# Patient Record
Sex: FEMALE | Race: WHITE | Employment: OTHER | URBAN - METROPOLITAN AREA
[De-identification: names, ages, dates, MRNs, and addresses within clinical notes are randomized per-mention and may not be internally consistent; named-entity substitution may affect disease eponyms.]

---

## 2017-01-31 RX ORDER — ATORVASTATIN CALCIUM 40 MG/1
TABLET, FILM COATED ORAL
Qty: 90 TABLET | Refills: 0 | Status: SHIPPED | OUTPATIENT
Start: 2017-01-31 | End: 2017-05-17

## 2017-01-31 NOTE — TELEPHONE ENCOUNTER
Requesting Atorvastatin and Dulera  LOV: 4/19/16  RTC: one year  Last Labs: 8/14/16 cmp, last lipid 4/19/16 and ACT 4/19/16 = 24  Filled: 7/19/16 #90 with 1 refills  Atorvastatin  Filled: 7/19/16 #3 inhalers with 1 refill    Dulera    No future appointment

## 2017-03-27 NOTE — TELEPHONE ENCOUNTER
Patient is scheduled for 04/26/2017 and needs her prescription to be filled until she comes in for her sherlyn. Please advise.

## 2017-04-26 ENCOUNTER — OFFICE VISIT (OUTPATIENT)
Dept: FAMILY MEDICINE CLINIC | Facility: CLINIC | Age: 72
End: 2017-04-26

## 2017-04-26 VITALS
RESPIRATION RATE: 16 BRPM | HEART RATE: 72 BPM | TEMPERATURE: 98 F | SYSTOLIC BLOOD PRESSURE: 120 MMHG | WEIGHT: 100.63 LBS | HEIGHT: 61 IN | DIASTOLIC BLOOD PRESSURE: 60 MMHG | BODY MASS INDEX: 19 KG/M2

## 2017-04-26 DIAGNOSIS — R73.09 OTHER ABNORMAL GLUCOSE: ICD-10-CM

## 2017-04-26 DIAGNOSIS — E78.01 FAMILIAL HYPERCHOLESTEROLEMIA: ICD-10-CM

## 2017-04-26 DIAGNOSIS — Z00.00 LABORATORY EXAM ORDERED AS PART OF ROUTINE GENERAL MEDICAL EXAMINATION: ICD-10-CM

## 2017-04-26 DIAGNOSIS — J45.30 MILD PERSISTENT ASTHMA WITHOUT COMPLICATION: ICD-10-CM

## 2017-04-26 DIAGNOSIS — Z12.39 SCREENING FOR BREAST CANCER: ICD-10-CM

## 2017-04-26 DIAGNOSIS — Z00.00 ANNUAL PHYSICAL EXAM: Primary | ICD-10-CM

## 2017-04-26 PROCEDURE — 99397 PER PM REEVAL EST PAT 65+ YR: CPT | Performed by: INTERNAL MEDICINE

## 2017-04-26 PROCEDURE — 99213 OFFICE O/P EST LOW 20 MIN: CPT | Performed by: INTERNAL MEDICINE

## 2017-04-26 NOTE — PROGRESS NOTES
Carlos Barr is a 66year old female who presents for a Annual physical exam.    Was having some constipation / takes miralax as needed     HLD   Denies any muscle aches or pains  Taking medication as directed  Taking baby asa     Asthma  Takes [address] CHOLEST 182 05/16/2015   CHOLEST 167 11/08/2014       Lab Results  Component Value Date   HDL 67 04/19/2016   HDL 87 05/16/2015   HDL 78 11/08/2014       Lab Results  Component Value Date   TRIG 157* 04/19/2016   TRIG 59 05/16/2015   TRIG 89 11/08/2014 able to afford your medications?: Yes    Hearing Problems?: No     Functional Status     Hearing Problems?: No    Vision Problems? : No (wears glasses)    Difficulty walking?: No    Difficulty dressing or bathing?: No    Problems with daily activities? : N 11/09/2013 95   ----------    Cardiovascular Disease Screening     LDL Annually LDL-CHOLESTEROL (mg/dL (calc))   Date Value   04/19/2016 83     LDL CHOLESTROL (mg/dL)   Date Value   09/05/2013 71        EKG - w/ Initial Preventative Physical Exam only, o Immunization Activity if applicable         SPECIFIC DISEASE MONITORING Internal Lab or Procedure External Lab or Procedure   Annual Monitoring of Persistent     Medications (ACE/ARB, digoxin, diuretics)    Potassium  Annually POTASSIUM (mmol/L)   Date Kathy INFORMATION:   Past Medical History   Diagnosis Date   • Diabetes mellitus ([facility] [address]    • Impaired fasting glucose    • Other psoriasis    • Asthma    • Hyperlipidemia           Past Surgical History    COLONOSCOPY  1/2003    OTHER SURGICAL HISTORY      Commen no chest tenderness  BREAST: no masses, no discharge or no axillary lymphadenopathy   LUNGS: clear to auscultation  CARDIO: RRR without murmur  GI: good BS's, no masses, HSM or tenderness  : deferred  RECTAL: deferred  MUSCULOSKELETAL: back is not tender 12/07/2013   • Zoster (Shingles) 12/07/2010

## 2017-05-17 NOTE — TELEPHONE ENCOUNTER
Requesting: Atorvastatin   LOV: 4/26/17  RTC: 1 year  Last Labs: 4/19/16  Filled: 1/31/17 #90 with 0 refills    No future appointments. Refill protocol failed because the patient did not meet the protocol criteria. Last labs were done 4/19/16.  Labs

## 2017-05-18 RX ORDER — ATORVASTATIN CALCIUM 40 MG/1
TABLET, FILM COATED ORAL
Qty: 90 TABLET | Refills: 0 | Status: SHIPPED | OUTPATIENT
Start: 2017-05-18 | End: 2017-10-18

## 2017-06-01 ENCOUNTER — TELEPHONE (OUTPATIENT)
Dept: FAMILY MEDICINE CLINIC | Facility: CLINIC | Age: 72
End: 2017-06-01

## 2017-06-01 NOTE — TELEPHONE ENCOUNTER
Patient was calling to get her lab results. I informed we sent a [employer] message on 5/30/17. Patient does not check her computer. Results given and patient verbalized understanding.

## 2017-10-07 ENCOUNTER — HOSPITAL ENCOUNTER (OUTPATIENT)
Dept: MAMMOGRAPHY | Age: 72
Discharge: HOME | End: 2017-10-07
Attending: INTERNAL MEDICINE
Payer: COMMERCIAL

## 2017-10-07 DIAGNOSIS — Z12.39 SCREENING FOR BREAST CANCER: ICD-10-CM

## 2017-10-07 PROCEDURE — 77067 SCR MAMMO BI INCL CAD: CPT | Performed by: INTERNAL MEDICINE

## 2017-10-10 DIAGNOSIS — Z12.31 ENCOUNTER FOR SCREENING MAMMOGRAM FOR MALIGNANT NEOPLASM OF BREAST: Primary | ICD-10-CM

## 2017-10-19 RX ORDER — ATORVASTATIN CALCIUM 40 MG/1
TABLET, FILM COATED ORAL
Qty: 90 TABLET | Refills: 1 | Status: SHIPPED | OUTPATIENT
Start: 2017-10-19 | End: 2018-04-21

## 2017-10-19 NOTE — TELEPHONE ENCOUNTER
Requesting Atorvastatin 40mg daily  LOV: 4/26/17  RTC: 1 year  Last Labs: 5/26/17  Filled: 5/18/17 #90 with 0 refills    No future appointments. Passed protocol - med approved.

## 2018-02-03 ENCOUNTER — APPOINTMENT (OUTPATIENT)
Dept: GENERAL RADIOLOGY | Age: 73
End: 2018-02-03
Payer: COMMERCIAL

## 2018-02-03 ENCOUNTER — HOSPITAL ENCOUNTER (EMERGENCY)
Age: 73
Discharge: HOME | End: 2018-02-03
Payer: COMMERCIAL

## 2018-02-03 VITALS
HEIGHT: 61 IN | SYSTOLIC BLOOD PRESSURE: 153 MMHG | HEART RATE: 92 BPM | OXYGEN SATURATION: 100 % | WEIGHT: 98 LBS | TEMPERATURE: 99 F | RESPIRATION RATE: 18 BRPM | DIASTOLIC BLOOD PRESSURE: 76 MMHG | BODY MASS INDEX: 18.5 KG/M2

## 2018-02-03 DIAGNOSIS — S20.212A RIB CONTUSION, LEFT, INITIAL ENCOUNTER: ICD-10-CM

## 2018-02-03 DIAGNOSIS — R93.89 ABNORMAL FINDING ON RADIOLOGY EXAM: Primary | ICD-10-CM

## 2018-02-03 PROCEDURE — 99283 EMERGENCY DEPT VISIT LOW MDM: CPT

## 2018-02-03 PROCEDURE — 71101 X-RAY EXAM UNILAT RIBS/CHEST: CPT

## 2018-02-03 RX ORDER — HYDROCODONE BITARTRATE AND ACETAMINOPHEN 5; 325 MG/1; MG/1
1 TABLET ORAL EVERY 6 HOURS PRN
Qty: 10 TABLET | Refills: 0 | Status: SHIPPED | OUTPATIENT
Start: 2018-02-03 | End: 2019-05-04 | Stop reason: ALTCHOICE

## 2018-02-03 NOTE — ED PROVIDER NOTES
Patient Seen in: [facility] Emergency Department In [place]    History   Patient presents with:  Trauma (cardiovascular, musculoskeletal)    Stated Complaint: rib pain    69-year-old female presents today with history of fall and left rib injury.   States oriented to person, place, and time. She appears well-developed and well-nourished. No distress. HENT:   Head: Normocephalic. Neck: Normal range of motion. Neck supple. Cardiovascular: Normal rate and regular rhythm.     Pulmonary/Chest: Effort normal Discussed results with patient. I will give patient prescription for Norco for severe pain. Instructed to take ibuprofen and still having pain then she can take Norco.  To follow with primary MD in 5-7 days for reevaluation and possible outpatient CT.

## 2018-02-03 NOTE — ED INITIAL ASSESSMENT (HPI)
Pt states that she fell backwards on Wed hitting lt side of back on cabinets. Denies feeling dizzy when she fell. States started hurting to lt side of back (ribs) yesterday.

## 2018-02-03 NOTE — ED NOTES
Pt states to RN upon discharge, \"Esther, I am very upset. That doctor or that nurse practitioner told my results of my xray in front of my son. He should have asked if he could tell the results in front of him.   I didn't want my son to know I have a sha

## 2018-02-07 ENCOUNTER — OFFICE VISIT (OUTPATIENT)
Dept: FAMILY MEDICINE CLINIC | Facility: CLINIC | Age: 73
End: 2018-02-07

## 2018-02-07 VITALS
SYSTOLIC BLOOD PRESSURE: 122 MMHG | DIASTOLIC BLOOD PRESSURE: 62 MMHG | HEIGHT: 61 IN | TEMPERATURE: 98 F | BODY MASS INDEX: 19.33 KG/M2 | RESPIRATION RATE: 16 BRPM | WEIGHT: 102.38 LBS | HEART RATE: 72 BPM

## 2018-02-07 DIAGNOSIS — R07.81 RIB PAIN ON LEFT SIDE: ICD-10-CM

## 2018-02-07 DIAGNOSIS — R91.1 LUNG NODULE SEEN ON IMAGING STUDY: Primary | ICD-10-CM

## 2018-02-07 PROCEDURE — 99213 OFFICE O/P EST LOW 20 MIN: CPT | Performed by: FAMILY MEDICINE

## 2018-02-07 NOTE — PROGRESS NOTES
HPI:   Shekhar Nix is a 74year old female that presents for follow-up rib pain. She fell down last week and hit her left side on the countertop.   She had no significant pain initially but over the next few days had worsening left lateral rib pain but Bottle, Rfl: 0    REVIEW OF SYSTEMS:     Comprehensive ROS negative unless noted in HPI    PHYSICAL EXAM:   /62   Pulse 72   Temp 97.9 °F (36.6 °C) (Oral)   Resp 16   Ht 61\"   Wt 102 lb 6 oz   BMI 19.34 kg/m²  Estimated body mass index is 19.34 kg/m

## 2018-02-07 NOTE — PATIENT INSTRUCTIONS
We will contact your insurance for CT scan approval, and call you to schedule. Feel free to contact us if you have not heard anything in 1 week. Rib Contusion     A rib contusion is a bruise to one or more rib bones.  It may cause pain, tenderness, s © 7191-7843 [address]. [address] [facility], [facility] [place]. All rights reserved. This information is not intended as a substitute for professional medical care. Always follow your healthcare professional's instructions.

## 2018-02-15 ENCOUNTER — HOSPITAL ENCOUNTER (OUTPATIENT)
Dept: CT IMAGING | Age: 73
Discharge: HOME | End: 2018-02-15
Attending: FAMILY MEDICINE
Payer: COMMERCIAL

## 2018-02-15 DIAGNOSIS — R91.1 LUNG NODULE SEEN ON IMAGING STUDY: ICD-10-CM

## 2018-02-15 PROCEDURE — 71250 CT THORAX DX C-: CPT | Performed by: FAMILY MEDICINE

## 2018-02-16 ENCOUNTER — TELEPHONE (OUTPATIENT)
Dept: FAMILY MEDICINE CLINIC | Facility: CLINIC | Age: 73
End: 2018-02-16

## 2018-02-16 NOTE — TELEPHONE ENCOUNTER
Patient informed of her result and was very relieved. I told her to call with any concerns.     Time: 2 min

## 2018-02-16 NOTE — TELEPHONE ENCOUNTER
CT chest resulted this Am, and released to my chart. It is negative. She can follow up with any questions or concerns, but CT is normal and reassuring.       Susan Elias, DO  Family Medicine

## 2018-04-12 ENCOUNTER — TELEPHONE (OUTPATIENT)
Dept: FAMILY MEDICINE CLINIC | Facility: CLINIC | Age: 73
End: 2018-04-12

## 2018-04-12 NOTE — TELEPHONE ENCOUNTER
Pt needs refills on  ,sent to mail order    DULERA 100-5 MCG/ACT Inhalation Aerosol 3 Inhaler 0 1/31/2017    Sig :  Use 2 puffs two times daily     Route:   (none)     Order #:   [id number]

## 2018-04-12 NOTE — TELEPHONE ENCOUNTER
Patient needs Laura He cancelled from [employer] and resubmitted to the correct insurance, [employer] and Mail order which is the per this request.

## 2018-04-12 NOTE — TELEPHONE ENCOUNTER
Requesting Dulera  LOV: 2/7/18  RTC: 2  Months   Last Relevant Labs: pp  Filled: 1/31/17 #3 inh with 0 refills    Future Appointments  Date Time Provider Sol Luna   4/21/2018 8:00 AM Emerson Wade,  [facility] [address] [facility]       Refilled per prot

## 2018-04-21 ENCOUNTER — OFFICE VISIT (OUTPATIENT)
Dept: FAMILY MEDICINE CLINIC | Facility: CLINIC | Age: 73
End: 2018-04-21

## 2018-04-21 ENCOUNTER — LAB ENCOUNTER (OUTPATIENT)
Dept: LAB | Age: 73
End: 2018-04-21
Attending: FAMILY MEDICINE
Payer: COMMERCIAL

## 2018-04-21 VITALS
HEIGHT: 60.5 IN | RESPIRATION RATE: 16 BRPM | DIASTOLIC BLOOD PRESSURE: 62 MMHG | HEART RATE: 62 BPM | WEIGHT: 99.13 LBS | TEMPERATURE: 98 F | SYSTOLIC BLOOD PRESSURE: 128 MMHG | BODY MASS INDEX: 18.96 KG/M2

## 2018-04-21 DIAGNOSIS — E78.2 MIXED HYPERLIPIDEMIA: ICD-10-CM

## 2018-04-21 DIAGNOSIS — R73.03 PRE-DIABETES: ICD-10-CM

## 2018-04-21 DIAGNOSIS — M81.0 AGE-RELATED OSTEOPOROSIS WITHOUT CURRENT PATHOLOGICAL FRACTURE: ICD-10-CM

## 2018-04-21 DIAGNOSIS — J45.40 MODERATE PERSISTENT ASTHMA WITHOUT COMPLICATION: ICD-10-CM

## 2018-04-21 DIAGNOSIS — Z51.81 THERAPEUTIC DRUG MONITORING: ICD-10-CM

## 2018-04-21 DIAGNOSIS — Z00.00 ENCOUNTER FOR ANNUAL HEALTH EXAMINATION: ICD-10-CM

## 2018-04-21 DIAGNOSIS — B02.9 HERPES ZOSTER WITHOUT COMPLICATION: ICD-10-CM

## 2018-04-21 DIAGNOSIS — Z00.00 ENCOUNTER FOR ANNUAL HEALTH EXAMINATION: Primary | ICD-10-CM

## 2018-04-21 PROCEDURE — 80061 LIPID PANEL: CPT

## 2018-04-21 PROCEDURE — 80053 COMPREHEN METABOLIC PANEL: CPT

## 2018-04-21 PROCEDURE — 99397 PER PM REEVAL EST PAT 65+ YR: CPT | Performed by: FAMILY MEDICINE

## 2018-04-21 PROCEDURE — 83036 HEMOGLOBIN GLYCOSYLATED A1C: CPT

## 2018-04-21 PROCEDURE — 85025 COMPLETE CBC W/AUTO DIFF WBC: CPT

## 2018-04-21 PROCEDURE — 36415 COLL VENOUS BLD VENIPUNCTURE: CPT

## 2018-04-21 PROCEDURE — 84443 ASSAY THYROID STIM HORMONE: CPT

## 2018-04-21 RX ORDER — ATORVASTATIN CALCIUM 40 MG/1
40 TABLET, FILM COATED ORAL
Qty: 90 TABLET | Refills: 1 | Status: SHIPPED | OUTPATIENT
Start: 2018-04-21 | End: 2018-04-23

## 2018-04-21 NOTE — PROGRESS NOTES
HPI:   Jacob Worthy is a 74year old female who presents for a annual wellness exam.    Annual Physical due on 04/26/2018        Fall/Risk Assessment   She has been screened for Falls and is low risk: Fall/Risk Scoring: 3    Cognitive Assessment   She restart.   Walk 2 miles 5 times per week and taked Calcium with D     Pre-diabetes: diet controlled, last a1c 6.0      HLD (hyperlipidemia): stable on statin without side effects     Herpes zoster without mention of complication     Hearing loss: stable, no tablet by mouth daily. MEDICAL INFORMATION:   She  has a past medical history of Aortic valve sclerosis (6/29/2012); Asthma; GERD (gastroesophageal reflux disease) (3/12/2013); Hearing loss (3/12/2013);  Herpes zoster without mention of complication (6 this encounter: 61\". Weight as of this encounter: 99 lb 2 oz.     Medicare Hearing Assessment  (Required for AWV/SWV)    Questionnaire - +hx of hearing difficulty, no hearing aids at this time         General Appearance:  Alert, cooperative, no distress Future  -     LIPID PANEL;  Future  -     TSH W REFLEX TO FREE T4; Future  -     HEMOGLOBIN A1C; Future    Pre-diabetes  - A1c 6.0 in 2016, weight it good, continue healthy diet and exercise  -     HEMOGLOBIN A1C; Future    Age-related osteoporosis without flowsheet data found.     Fasting Blood Sugar (FSB)Annually   GLUCOSE (mg/dL)   Date Value   05/26/2017 94   ----------       Cardiovascular Disease Screening     LDL Annually LDL-CHOLESTEROL (mg/dL (calc))   Date Value   05/26/2017 82        EKG - w/ Initi the mentally retarded   Persons who live in the same house as a HepB virus carrier   Homosexual men   Illicit injectable drug abusers     Tetanus Toxoid  Only covered with a cut with metal- TD and TDaP Not covered by Medicare Part B No vaccine history foun

## 2018-04-21 NOTE — PATIENT INSTRUCTIONS
Aida Esquivel's SCREENING SCHEDULE   Tests on this list are recommended by your physician but may not be covered, or covered at this frequency, by your insurer. Please check with your insurance carrier before scheduling to verify coverage.    PREVENTATIV Colonoscopy Screen   Covered every 10 years- more often if abnormal Colonoscopy,3 Years due on 03/22/2021 Update Health Maintenance if applicable    Flex Sigmoidoscopy Screen  Covered every 5 years No results found for this or any previous visit.  No flowsh (Pneumovax)  Covered Once after 65   Orders placed or performed in visit on 09/05/13  -PNEUMOCOCCAL IMMUNIZATION    Please get once after your 65th birthday    Hepatitis B for Moderate/High Risk       No orders found for this or any previous visit.  Medium/

## 2018-04-23 ENCOUNTER — TELEPHONE (OUTPATIENT)
Dept: FAMILY MEDICINE CLINIC | Facility: CLINIC | Age: 73
End: 2018-04-23

## 2018-04-23 DIAGNOSIS — E78.2 MIXED HYPERLIPIDEMIA: ICD-10-CM

## 2018-04-23 RX ORDER — ATORVASTATIN CALCIUM 40 MG/1
40 TABLET, FILM COATED ORAL
Qty: 90 TABLET | Refills: 1 | Status: SHIPPED | OUTPATIENT
Start: 2018-04-23 | End: 2018-12-07

## 2018-04-23 NOTE — TELEPHONE ENCOUNTER
Medication re-sent to [facility] and cancelled at local pharmacy  LM informing pt

## 2018-04-23 NOTE — TELEPHONE ENCOUNTER
Patient states that her Atorvastatin was sent to local pharmacy instead of her mail order pharmacy. Please send for 90 days.

## 2018-05-03 ENCOUNTER — TELEPHONE (OUTPATIENT)
Dept: FAMILY MEDICINE CLINIC | Facility: CLINIC | Age: 73
End: 2018-05-03

## 2018-05-03 NOTE — TELEPHONE ENCOUNTER
Pt is asking about her lab results from 2 weeks ago. Please call her back at work - [phone]. Thanks!

## 2018-05-03 NOTE — TELEPHONE ENCOUNTER
Spoke to pt and informed of lab results. Notes recorded by Michelle Fischer DO on 4/23/2018 at 12:48 PM CDT  Results reviewed. Released to [address] Tests show no significant abnormalities.

## 2018-10-06 ENCOUNTER — HOSPITAL ENCOUNTER (OUTPATIENT)
Dept: MAMMOGRAPHY | Age: 73
Discharge: HOME | End: 2018-10-06
Attending: INTERNAL MEDICINE
Payer: COMMERCIAL

## 2018-10-06 DIAGNOSIS — Z12.31 ENCOUNTER FOR SCREENING MAMMOGRAM FOR MALIGNANT NEOPLASM OF BREAST: ICD-10-CM

## 2018-10-06 PROCEDURE — 77067 SCR MAMMO BI INCL CAD: CPT | Performed by: INTERNAL MEDICINE

## 2018-12-01 ENCOUNTER — HOSPITAL ENCOUNTER (OUTPATIENT)
Age: 73
Discharge: HOME | End: 2018-12-01
Attending: FAMILY MEDICINE
Payer: COMMERCIAL

## 2018-12-01 VITALS
RESPIRATION RATE: 16 BRPM | HEART RATE: 79 BPM | TEMPERATURE: 98 F | SYSTOLIC BLOOD PRESSURE: 111 MMHG | DIASTOLIC BLOOD PRESSURE: 75 MMHG | OXYGEN SATURATION: 100 %

## 2018-12-01 DIAGNOSIS — I88.9 INGUINAL LYMPHADENITIS: Primary | ICD-10-CM

## 2018-12-01 PROCEDURE — 81002 URINALYSIS NONAUTO W/O SCOPE: CPT | Performed by: FAMILY MEDICINE

## 2018-12-01 PROCEDURE — 87086 URINE CULTURE/COLONY COUNT: CPT | Performed by: FAMILY MEDICINE

## 2018-12-01 PROCEDURE — 99214 OFFICE O/P EST MOD 30 MIN: CPT

## 2018-12-01 PROCEDURE — 99202 OFFICE O/P NEW SF 15 MIN: CPT

## 2018-12-01 RX ORDER — CEPHALEXIN 500 MG/1
500 CAPSULE ORAL 3 TIMES DAILY
Qty: 21 CAPSULE | Refills: 0 | Status: SHIPPED | OUTPATIENT
Start: 2018-12-01 | End: 2018-12-08

## 2018-12-01 NOTE — ED NOTES
Discussed medication with patient and symptoms treatment thoroughly. All questions answered. Patient verbalized understanding of plan of care.

## 2018-12-01 NOTE — ED PROVIDER NOTES
Patient Seen in: [facility] In [place]    History   Patient presents with:  Groin Pain    Stated Complaint: Lump or swollen gland in groin area    HPI  75 yo F here with complaints of lump or swollen gland noted in the L groin area - no other o All other systems reviewed and negative except as noted above.     Physical Exam     ED Triage Vitals [12/01/18 0934]   /75   Pulse 79   Resp 16   Temp 98.3 °F (36.8 °C)   Temp src Temporal   SpO2 100 %   O2 Device        Current:/75   Pulse 10:13 am    Follow-up:  Earle Lopez DO  [address] [facility]  [phone]    In 2 days  For re-check of symptoms        Medications Prescribed:  Discharge Medication List as of 12/1/2018 10:18 AM    START taking these medi

## 2018-12-01 NOTE — ED INITIAL ASSESSMENT (HPI)
Patient c/o swelling and tenderness to left groin. Was feeling some discomfort throughout the week. Noticed the swelling last night. Today area is tender.

## 2018-12-07 DIAGNOSIS — E78.2 MIXED HYPERLIPIDEMIA: ICD-10-CM

## 2018-12-07 RX ORDER — ATORVASTATIN CALCIUM 40 MG/1
TABLET, FILM COATED ORAL
Qty: 90 TABLET | Refills: 0 | Status: SHIPPED | OUTPATIENT
Start: 2018-12-07 | End: 2019-03-21

## 2018-12-07 NOTE — TELEPHONE ENCOUNTER
Requested Medications      Name from pharmacy: Atorvastatin Calcium 40 MG Tablet         Will file in chart as: ATORVASTATIN 40 MG Oral Tab    Sig: TAKE 1 TABLET BY MOUTH DAILY    Disp:  90 tablet    Refills:  3    Start: 12/7/2018    Class: Normal    For:

## 2018-12-23 ENCOUNTER — NURSE ONLY (OUTPATIENT)
Dept: FAMILY MEDICINE CLINIC | Facility: CLINIC | Age: 73
End: 2018-12-23
Payer: COMMERCIAL

## 2018-12-23 VITALS
SYSTOLIC BLOOD PRESSURE: 108 MMHG | DIASTOLIC BLOOD PRESSURE: 64 MMHG | BODY MASS INDEX: 18.88 KG/M2 | RESPIRATION RATE: 16 BRPM | HEIGHT: 61 IN | HEART RATE: 87 BPM | WEIGHT: 100 LBS | TEMPERATURE: 98 F | OXYGEN SATURATION: 99 %

## 2018-12-23 DIAGNOSIS — H92.01 RIGHT EAR PAIN: Primary | ICD-10-CM

## 2018-12-23 PROCEDURE — 99213 OFFICE O/P EST LOW 20 MIN: CPT | Performed by: NURSE PRACTITIONER

## 2018-12-23 NOTE — PROGRESS NOTES
CHIEF COMPLAINT:   Patient presents with:  Ear Pain: x 2 days      HPI:   Josiah Michaels is a 74year old female who presents to clinic today with complaints of right ear pain. Has had for 2  days. Pain is described as stabbing.   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 Does not want to restart.      • Other and unspecified ovarian cyst 6/29/2012   • Other psoriasis    • Pre-diabetes    • Wears glasses PLAN: Meds as listed below.   Comfort measures as described in Patient Instructions  Educated on otc medication  Educated supportive measures  Educated on s/s of worsening  Follow up with pcp if not improving    Meds & Refills for this Visit:  Requested Pre Because the middle ear fluid can become infected, it is important to watch for signs of an ear infection which may develop later. These signs include increased ear pain, fever, or drainage from the ear.   Home care  The following guidelines will help you ca

## 2019-03-21 ENCOUNTER — TELEPHONE (OUTPATIENT)
Dept: FAMILY MEDICINE CLINIC | Facility: CLINIC | Age: 74
End: 2019-03-21

## 2019-03-21 DIAGNOSIS — E78.2 MIXED HYPERLIPIDEMIA: ICD-10-CM

## 2019-03-21 RX ORDER — ATORVASTATIN CALCIUM 40 MG/1
40 TABLET, FILM COATED ORAL
Qty: 90 TABLET | Refills: 0 | Status: SHIPPED | OUTPATIENT
Start: 2019-03-21 | End: 2019-03-22

## 2019-03-21 NOTE — TELEPHONE ENCOUNTER
Requesting: Atorvastatin  LOV: 4/21/18  RTC: 6 months  Last Relevant Labs: 4/21/18  Filled: 12/7/18 #90 with 0 refills    Future Appointments   Date Time Provider Sol Luna   5/4/2019  8:30 AM Racquel Wade, DO [facility] [address]     Refilled u

## 2019-03-21 NOTE — TELEPHONE ENCOUNTER
Patient is requesting a refill of ATORVASTATIN 40 MG Oral Tab  I scheduled her physical    Future Appointments   Date Time Provider Sol Luna   5/4/2019  8:30 AM Ivanna Wade,  [facility] [address]

## 2019-03-22 ENCOUNTER — TELEPHONE (OUTPATIENT)
Dept: FAMILY MEDICINE CLINIC | Facility: CLINIC | Age: 74
End: 2019-03-22

## 2019-03-22 DIAGNOSIS — E78.2 MIXED HYPERLIPIDEMIA: ICD-10-CM

## 2019-03-22 RX ORDER — ATORVASTATIN CALCIUM 40 MG/1
40 TABLET, FILM COATED ORAL
Qty: 90 TABLET | Refills: 0 | Status: SHIPPED | OUTPATIENT
Start: 2019-03-22 | End: 2019-05-04

## 2019-03-22 NOTE — TELEPHONE ENCOUNTER
Pt would like her atorvastatin 40 MG Oral Tab to be sent to: ([address] [place] [address], [address] [phone], [phone]) instead. Pt would also like a call to confirm it has been taken care of.

## 2019-04-24 ENCOUNTER — TELEPHONE (OUTPATIENT)
Dept: FAMILY MEDICINE CLINIC | Facility: CLINIC | Age: 74
End: 2019-04-24

## 2019-04-24 DIAGNOSIS — Z00.00 LABORATORY EXAMINATION ORDERED AS PART OF A ROUTINE GENERAL MEDICAL EXAMINATION: ICD-10-CM

## 2019-04-24 DIAGNOSIS — Z51.81 ENCOUNTER FOR THERAPEUTIC DRUG MONITORING: ICD-10-CM

## 2019-04-24 DIAGNOSIS — E78.49 OTHER HYPERLIPIDEMIA: ICD-10-CM

## 2019-04-24 DIAGNOSIS — R73.03 PRE-DIABETES: Primary | ICD-10-CM

## 2019-04-24 NOTE — TELEPHONE ENCOUNTER
lmom for patient - wanted to confirm reason for visit due to \"routine\"  Used in description, but PHysical used in visit type   Does this patient want a Med refill visit, or a physical.

## 2019-04-24 NOTE — TELEPHONE ENCOUNTER
Future Appointments   Date Time Provider Sol Luna   5/4/2019  8:30 AM Dontrell Wade, DO [facility] [address]     Patient has been notified via [employer] reminder her to have her labs done 1-2 days prior to appt.     Labs pended for review/approval.

## 2019-05-04 ENCOUNTER — OFFICE VISIT (OUTPATIENT)
Dept: FAMILY MEDICINE CLINIC | Facility: CLINIC | Age: 74
End: 2019-05-04
Payer: COMMERCIAL

## 2019-05-04 ENCOUNTER — LAB ENCOUNTER (OUTPATIENT)
Dept: LAB | Age: 74
End: 2019-05-04
Attending: FAMILY MEDICINE
Payer: COMMERCIAL

## 2019-05-04 VITALS
SYSTOLIC BLOOD PRESSURE: 120 MMHG | HEIGHT: 61 IN | BODY MASS INDEX: 18.53 KG/M2 | WEIGHT: 98.13 LBS | RESPIRATION RATE: 16 BRPM | TEMPERATURE: 98 F | HEART RATE: 68 BPM | DIASTOLIC BLOOD PRESSURE: 62 MMHG

## 2019-05-04 DIAGNOSIS — R73.03 PRE-DIABETES: ICD-10-CM

## 2019-05-04 DIAGNOSIS — Z00.00 ANNUAL PHYSICAL EXAM: Primary | ICD-10-CM

## 2019-05-04 DIAGNOSIS — Z51.81 ENCOUNTER FOR THERAPEUTIC DRUG MONITORING: ICD-10-CM

## 2019-05-04 DIAGNOSIS — Z12.39 BREAST CANCER SCREENING: ICD-10-CM

## 2019-05-04 DIAGNOSIS — Z13.31 NEGATIVE DEPRESSION SCREENING: ICD-10-CM

## 2019-05-04 DIAGNOSIS — E78.49 OTHER HYPERLIPIDEMIA: ICD-10-CM

## 2019-05-04 DIAGNOSIS — J45.41 MODERATE PERSISTENT ASTHMA WITH ACUTE EXACERBATION: ICD-10-CM

## 2019-05-04 DIAGNOSIS — E78.2 MIXED HYPERLIPIDEMIA: ICD-10-CM

## 2019-05-04 DIAGNOSIS — Z00.00 LABORATORY EXAMINATION ORDERED AS PART OF A ROUTINE GENERAL MEDICAL EXAMINATION: ICD-10-CM

## 2019-05-04 PROCEDURE — 85025 COMPLETE CBC W/AUTO DIFF WBC: CPT

## 2019-05-04 PROCEDURE — 80061 LIPID PANEL: CPT

## 2019-05-04 PROCEDURE — 36415 COLL VENOUS BLD VENIPUNCTURE: CPT

## 2019-05-04 PROCEDURE — 80053 COMPREHEN METABOLIC PANEL: CPT

## 2019-05-04 PROCEDURE — 84443 ASSAY THYROID STIM HORMONE: CPT

## 2019-05-04 PROCEDURE — 99397 PER PM REEVAL EST PAT 65+ YR: CPT | Performed by: FAMILY MEDICINE

## 2019-05-04 PROCEDURE — 83036 HEMOGLOBIN GLYCOSYLATED A1C: CPT

## 2019-05-04 RX ORDER — ALBUTEROL SULFATE 90 UG/1
AEROSOL, METERED RESPIRATORY (INHALATION)
Qty: 36 G | Refills: 5 | Status: SHIPPED | OUTPATIENT
Start: 2019-05-04 | End: 2020-03-03

## 2019-05-04 RX ORDER — ATORVASTATIN CALCIUM 40 MG/1
40 TABLET, FILM COATED ORAL
Qty: 90 TABLET | Refills: 3 | Status: SHIPPED | OUTPATIENT
Start: 2019-05-04 | End: 2020-03-03

## 2019-05-04 NOTE — PROGRESS NOTES
Jayy Graham is a 74year old female that presents for annual physical exam.     STI testing desired: no  Mammogram: 10/16/18  Colonoscopy: 3/22/18  PHQ2: 0  Vaccines: declines pneumonia and shingrix, but will consider getting at pharmacy   Diet and Exer Inhalation Aerosol, INHALE 2 PUFFS ORALLY TWICE DAILY, Disp: 3 Inhaler, Rfl: 3  •  Albuterol Sulfate HFA (VENTOLIN HFA) 108 (90 Base) MCG/ACT Inhalation Aero Soln, Use 2 puffs every 6 hours  as needed, Disp: 36 g, Rfl: 5  •  Calcium Carbonate-Vitamin D (CA Highest education level: Not on file    Occupational History      Occupation:  SUPERVISOR        Employer: [employer]        Comment:     Social Needs      Financial resource strain: Not on file      Food insecurity:        W GENERAL: feels well otherwise  SKIN: denies any unusual skin lesions  EYES: denies blurred vision or double vision  HEENT: denies nasal congestion, sinus pain or ST  LUNGS: denies shortness of breath with exertion  CARDIOVASCULAR: denies chest pain on ex Negative depression screening    3. Breast cancer screening  - [facility] DUTCH 2D+3D SCREENING BILAT (CPT=77067/00618); Future    4. Mixed hyperlipidemia  - atorvastatin 40 MG Oral Tab; Take 1 tablet (40 mg total) by mouth once daily. Dispense: 90 tablet;  Refill

## 2019-05-04 NOTE — PATIENT INSTRUCTIONS
Thank you for allowing me to participate in your care today. I will contact you with any results from today's visit. Lab results are typically available in 2-3 days for blood tests, and 3-5 days for any cultures or Paps.    Please let me know if you hav age 72 Talk with your healthcare provider   Chlamydia Women at increased risk for infection At routine exams   Colorectal cancer All women over the age of 48  This screening is advised against for women over 76 or if there is a life expectancy of less than provider Ask your healthcare provider   Vision All women in this age group Every 1 to 2 years; if you have a chronic health condition, ask your healthcare provider if you need exams more often   Vaccine Who needs it How often   Chickenpox (varicella) All w risk of getting CVD within the next 10 years. People who are not at increased risk for bleeding, have a life expectancy of at least 10 years, and are willing to take low-dose aspirin daily for at least 10 years are more likely to benefit.  When the advanta

## 2019-05-15 ENCOUNTER — TELEPHONE (OUTPATIENT)
Dept: FAMILY MEDICINE CLINIC | Facility: CLINIC | Age: 74
End: 2019-05-15

## 2019-05-15 NOTE — TELEPHONE ENCOUNTER
See results below. Spoke with patient regarding lab results. Patient aware, all questions answered. Patient verbalized understanding       Notes recorded by Koffi DO Esthela on 5/6/2019 at 12:55 PM CDT  Results reviewed. Released to [address]  Tests show

## 2019-08-15 ENCOUNTER — OFFICE VISIT (OUTPATIENT)
Dept: FAMILY MEDICINE CLINIC | Facility: CLINIC | Age: 74
End: 2019-08-15
Payer: COMMERCIAL

## 2019-08-15 ENCOUNTER — HOSPITAL ENCOUNTER (EMERGENCY)
Age: 74
Discharge: HOME | End: 2019-08-15
Attending: EMERGENCY MEDICINE
Payer: COMMERCIAL

## 2019-08-15 ENCOUNTER — APPOINTMENT (OUTPATIENT)
Dept: GENERAL RADIOLOGY | Age: 74
End: 2019-08-15
Payer: COMMERCIAL

## 2019-08-15 VITALS
HEART RATE: 84 BPM | BODY MASS INDEX: 18.31 KG/M2 | HEIGHT: 61 IN | TEMPERATURE: 98 F | SYSTOLIC BLOOD PRESSURE: 131 MMHG | DIASTOLIC BLOOD PRESSURE: 61 MMHG | RESPIRATION RATE: 18 BRPM | WEIGHT: 97 LBS | OXYGEN SATURATION: 99 %

## 2019-08-15 VITALS
WEIGHT: 97.63 LBS | HEART RATE: 75 BPM | TEMPERATURE: 98 F | BODY MASS INDEX: 18.43 KG/M2 | OXYGEN SATURATION: 98 % | HEIGHT: 61 IN | SYSTOLIC BLOOD PRESSURE: 130 MMHG | DIASTOLIC BLOOD PRESSURE: 68 MMHG | RESPIRATION RATE: 16 BRPM

## 2019-08-15 DIAGNOSIS — R50.9 FEVER, UNSPECIFIED FEVER CAUSE: ICD-10-CM

## 2019-08-15 DIAGNOSIS — R05.9 COUGH: ICD-10-CM

## 2019-08-15 DIAGNOSIS — R07.89 ATYPICAL CHEST PAIN: Primary | ICD-10-CM

## 2019-08-15 DIAGNOSIS — R05.9 COUGH: Primary | ICD-10-CM

## 2019-08-15 DIAGNOSIS — Z87.891 FORMER SMOKER: ICD-10-CM

## 2019-08-15 PROCEDURE — 87430 STREP A AG IA: CPT

## 2019-08-15 PROCEDURE — 87081 CULTURE SCREEN ONLY: CPT

## 2019-08-15 PROCEDURE — 87430 STREP A AG IA: CPT | Performed by: EMERGENCY MEDICINE

## 2019-08-15 PROCEDURE — 71046 X-RAY EXAM CHEST 2 VIEWS: CPT

## 2019-08-15 PROCEDURE — 99284 EMERGENCY DEPT VISIT MOD MDM: CPT

## 2019-08-15 PROCEDURE — 87081 CULTURE SCREEN ONLY: CPT | Performed by: EMERGENCY MEDICINE

## 2019-08-15 PROCEDURE — 99283 EMERGENCY DEPT VISIT LOW MDM: CPT

## 2019-08-15 RX ORDER — AZITHROMYCIN 250 MG/1
TABLET, FILM COATED ORAL
Qty: 1 PACKAGE | Refills: 0 | Status: SHIPPED | OUTPATIENT
Start: 2019-08-15 | End: 2019-08-20

## 2019-08-15 RX ORDER — FLUTICASONE PROPIONATE 50 MCG
2 SPRAY, SUSPENSION (ML) NASAL DAILY
Qty: 16 G | Refills: 0 | Status: SHIPPED | OUTPATIENT
Start: 2019-08-15 | End: 2019-09-14

## 2019-08-15 NOTE — ED PROVIDER NOTES
Patient Seen in: THE [facility] Emergency Department In [facility] [place] [address]    History   Patient presents with:  Fever (infectious)  Cough/URI    Stated Complaint: cough fever and shortness of breath, fever last night    HPI    Patient is a very pleasant 77-year-old fema breath, fever last night  Other systems are as noted in HPI. Constitutional and vital signs reviewed. All other systems reviewed and negative except as noted above.     Physical Exam     ED Triage Vitals [08/15/19 1355]   /61   Pulse 84   Resp 1 fever. Pt has a history of asthma and smoking. FINDINGS:  Normal heart size and pulmonary vascularity. No pleural effusion or pneumothorax. No lobar consolidation. Stable COPD changes. Calcified plaque in the thoracic aorta.   Degenerative changes in t

## 2019-08-15 NOTE — ED INITIAL ASSESSMENT (HPI)
REPORTS SORE THROAT FOR PAST WEEK.  2 DAYS WITH COUGH AND FEVER AND RIB PAIN. STATES PAIN AND COUGH GETTING WORSE.   TOOK 400MG IBUPROFEN ABOUT 0900

## 2019-08-15 NOTE — PROGRESS NOTES
Pt presents to [facility] for evaluation of cough, malaise and back pain. Back pain occurs spontaneously. It is not reproducible with movement, coughing or deep inspiration. Pt takes inhaler daily for asthma.  She states with prolonged expiration, she feels pain

## 2019-10-29 ENCOUNTER — HOSPITAL ENCOUNTER (OUTPATIENT)
Dept: MAMMOGRAPHY | Age: 74
Discharge: HOME | End: 2019-10-29
Attending: FAMILY MEDICINE
Payer: COMMERCIAL

## 2019-10-29 DIAGNOSIS — Z12.39 BREAST CANCER SCREENING: ICD-10-CM

## 2019-10-29 PROCEDURE — 77067 SCR MAMMO BI INCL CAD: CPT | Performed by: FAMILY MEDICINE

## 2019-10-29 PROCEDURE — 77063 BREAST TOMOSYNTHESIS BI: CPT | Performed by: FAMILY MEDICINE

## 2020-01-02 ENCOUNTER — APPOINTMENT (OUTPATIENT)
Dept: CARDIOLOGY | Age: 75
End: 2020-01-02

## 2020-02-25 ENCOUNTER — TELEPHONE (OUTPATIENT)
Dept: FAMILY MEDICINE CLINIC | Facility: CLINIC | Age: 75
End: 2020-02-25

## 2020-02-25 DIAGNOSIS — Z00.00 LABORATORY EXAMINATION ORDERED AS PART OF A ROUTINE GENERAL MEDICAL EXAMINATION: Primary | ICD-10-CM

## 2020-02-25 NOTE — TELEPHONE ENCOUNTER
Pt would like to know if she can have lab orders prior to her wellness visit.   Future Appointments   Date Time Provider Sol Luna   3/3/2020  6:30 PM Rahel Wade,  [facility] [address]

## 2020-02-25 NOTE — TELEPHONE ENCOUNTER
Spoke to patient and informed of orders being placed, advised to fast 8-10 hrs prior to completing the labs. Patient verbalized understanding with intent to comply.    Future Appointments   Date Time Provider Sol Luna   3/3/2020  6:30 PM Ying Wade

## 2020-02-29 ENCOUNTER — APPOINTMENT (OUTPATIENT)
Dept: LAB | Age: 75
End: 2020-02-29
Attending: FAMILY MEDICINE
Payer: COMMERCIAL

## 2020-02-29 DIAGNOSIS — Z00.00 LABORATORY EXAMINATION ORDERED AS PART OF A ROUTINE GENERAL MEDICAL EXAMINATION: ICD-10-CM

## 2020-02-29 LAB
ALBUMIN SERPL-MCNC: 4.2 G/DL (ref 3.4–5)
ALBUMIN/GLOB SERPL: 1.3 {RATIO} (ref 1–2)
ALP LIVER SERPL-CCNC: 80 U/L (ref 55–142)
ALT SERPL-CCNC: 19 U/L (ref 13–56)
ANION GAP SERPL CALC-SCNC: 4 MMOL/L (ref 0–18)
AST SERPL-CCNC: 20 U/L (ref 15–37)
BASOPHILS # BLD AUTO: 0.05 X10(3) UL (ref 0–0.2)
BASOPHILS NFR BLD AUTO: 1.2 %
BILIRUB SERPL-MCNC: 0.8 MG/DL (ref 0.1–2)
BUN BLD-MCNC: 14 MG/DL (ref 7–18)
BUN/CREAT SERPL: 21.9 (ref 10–20)
CALCIUM BLD-MCNC: 9.1 MG/DL (ref 8.5–10.1)
CHLORIDE SERPL-SCNC: 109 MMOL/L (ref 98–112)
CHOLEST SMN-MCNC: 165 MG/DL (ref ?–200)
CO2 SERPL-SCNC: 29 MMOL/L (ref 21–32)
CREAT BLD-MCNC: 0.64 MG/DL (ref 0.55–1.02)
DEPRECATED RDW RBC AUTO: 43.8 FL (ref 35.1–46.3)
EOSINOPHIL # BLD AUTO: 0.11 X10(3) UL (ref 0–0.7)
EOSINOPHIL NFR BLD AUTO: 2.7 %
ERYTHROCYTE [DISTWIDTH] IN BLOOD BY AUTOMATED COUNT: 13.5 % (ref 11–15)
GLOBULIN PLAS-MCNC: 3.3 G/DL (ref 2.8–4.4)
GLUCOSE BLD-MCNC: 85 MG/DL (ref 70–99)
HCT VFR BLD AUTO: 41.7 % (ref 35–48)
HDLC SERPL-MCNC: 78 MG/DL (ref 40–59)
HGB BLD-MCNC: 13.1 G/DL (ref 12–16)
IMM GRANULOCYTES # BLD AUTO: 0.01 X10(3) UL (ref 0–1)
IMM GRANULOCYTES NFR BLD: 0.2 %
LDLC SERPL CALC-MCNC: 72 MG/DL (ref ?–100)
LYMPHOCYTES # BLD AUTO: 1.14 X10(3) UL (ref 1–4)
LYMPHOCYTES NFR BLD AUTO: 28 %
M PROTEIN MFR SERPL ELPH: 7.5 G/DL (ref 6.4–8.2)
MCH RBC QN AUTO: 28.1 PG (ref 26–34)
MCHC RBC AUTO-ENTMCNC: 31.4 G/DL (ref 31–37)
MCV RBC AUTO: 89.3 FL (ref 80–100)
MONOCYTES # BLD AUTO: 0.42 X10(3) UL (ref 0.1–1)
MONOCYTES NFR BLD AUTO: 10.3 %
NEUTROPHILS # BLD AUTO: 2.34 X10 (3) UL (ref 1.5–7.7)
NEUTROPHILS # BLD AUTO: 2.34 X10(3) UL (ref 1.5–7.7)
NEUTROPHILS NFR BLD AUTO: 57.6 %
NONHDLC SERPL-MCNC: 87 MG/DL (ref ?–130)
OSMOLALITY SERPL CALC.SUM OF ELEC: 294 MOSM/KG (ref 275–295)
PATIENT FASTING Y/N/NP: YES
PATIENT FASTING Y/N/NP: YES
PLATELET # BLD AUTO: 289 10(3)UL (ref 150–450)
POTASSIUM SERPL-SCNC: 3.9 MMOL/L (ref 3.5–5.1)
RBC # BLD AUTO: 4.67 X10(6)UL (ref 3.8–5.3)
SODIUM SERPL-SCNC: 142 MMOL/L (ref 136–145)
TRIGL SERPL-MCNC: 75 MG/DL (ref 30–149)
VIT D+METAB SERPL-MCNC: 49.4 NG/ML (ref 30–100)
VLDLC SERPL CALC-MCNC: 15 MG/DL (ref 0–30)
WBC # BLD AUTO: 4.1 X10(3) UL (ref 4–11)

## 2020-02-29 PROCEDURE — 80053 COMPREHEN METABOLIC PANEL: CPT | Performed by: FAMILY MEDICINE

## 2020-02-29 PROCEDURE — 80061 LIPID PANEL: CPT | Performed by: FAMILY MEDICINE

## 2020-02-29 PROCEDURE — 84439 ASSAY OF FREE THYROXINE: CPT

## 2020-02-29 PROCEDURE — 84443 ASSAY THYROID STIM HORMONE: CPT

## 2020-02-29 PROCEDURE — 36415 COLL VENOUS BLD VENIPUNCTURE: CPT | Performed by: FAMILY MEDICINE

## 2020-02-29 PROCEDURE — 83036 HEMOGLOBIN GLYCOSYLATED A1C: CPT | Performed by: FAMILY MEDICINE

## 2020-02-29 PROCEDURE — 85025 COMPLETE CBC W/AUTO DIFF WBC: CPT | Performed by: FAMILY MEDICINE

## 2020-02-29 PROCEDURE — 82306 VITAMIN D 25 HYDROXY: CPT | Performed by: FAMILY MEDICINE

## 2020-03-03 ENCOUNTER — OFFICE VISIT (OUTPATIENT)
Dept: FAMILY MEDICINE CLINIC | Facility: CLINIC | Age: 75
End: 2020-03-03
Payer: COMMERCIAL

## 2020-03-03 VITALS
SYSTOLIC BLOOD PRESSURE: 130 MMHG | DIASTOLIC BLOOD PRESSURE: 60 MMHG | HEIGHT: 61 IN | TEMPERATURE: 98 F | BODY MASS INDEX: 19.45 KG/M2 | RESPIRATION RATE: 16 BRPM | WEIGHT: 103 LBS | HEART RATE: 72 BPM

## 2020-03-03 DIAGNOSIS — J45.41 MODERATE PERSISTENT ASTHMA WITH ACUTE EXACERBATION: ICD-10-CM

## 2020-03-03 DIAGNOSIS — Z00.00 ANNUAL PHYSICAL EXAM: Primary | ICD-10-CM

## 2020-03-03 DIAGNOSIS — Z13.31 NEGATIVE DEPRESSION SCREENING: ICD-10-CM

## 2020-03-03 DIAGNOSIS — R73.03 PREDIABETES: ICD-10-CM

## 2020-03-03 DIAGNOSIS — E78.2 MIXED HYPERLIPIDEMIA: ICD-10-CM

## 2020-03-03 DIAGNOSIS — Z12.31 BREAST CANCER SCREENING BY MAMMOGRAM: ICD-10-CM

## 2020-03-03 LAB
EST. AVERAGE GLUCOSE BLD GHB EST-MCNC: 126 MG/DL (ref 68–126)
HBA1C MFR BLD HPLC: 6 % (ref ?–5.7)

## 2020-03-03 PROCEDURE — 99397 PER PM REEVAL EST PAT 65+ YR: CPT | Performed by: FAMILY MEDICINE

## 2020-03-03 RX ORDER — ATORVASTATIN CALCIUM 40 MG/1
40 TABLET, FILM COATED ORAL
Qty: 90 TABLET | Refills: 3 | Status: SHIPPED | OUTPATIENT
Start: 2020-03-03 | End: 2021-02-23

## 2020-03-03 RX ORDER — ALBUTEROL SULFATE 90 UG/1
AEROSOL, METERED RESPIRATORY (INHALATION)
Qty: 1 INHALER | Refills: 2 | Status: SHIPPED | OUTPATIENT
Start: 2020-03-03 | End: 2021-02-23

## 2020-03-03 RX ORDER — MINOCYCLINE HYDROCHLORIDE 45 MG/1
45 TABLET, FILM COATED, EXTENDED RELEASE ORAL DAILY
COMMUNITY
End: 2020-09-10 | Stop reason: ALTCHOICE

## 2020-03-04 NOTE — PROGRESS NOTES
Ambar Jc is a 66year old female that presents for annual physical exam. Labs completed and d/w pt. She is considering retiring in the spring, excited and nervous.       Hx of abnormal pap: No, no postmenopausal bleeding  STI testing desired: No  Candace zoster            Recurrent outbreaks on left side of face          Current Outpatient Medications:   •  atorvastatin 40 MG Oral Tab, Take 1 tablet (40 mg total) by mouth once daily. , Disp: 90 tablet, Rfl: 3  •  Mometasone Furo-Formoterol Fum (DULERA) 100- Problem Relation Age of Onset   • Cancer Father         Colon   • Other (Other) Father    • Breast Cancer Paternal Cousin Female 32   • Diabetes Son    • Cancer Brother         Prostate       Social History    Socioeconomic History      Marital status: D Concern: Not Asked        Stress Concern: Not Asked        Weight Concern: Not Asked        Special Diet: Not Asked        Back Care: Not Asked        Exercise: Yes          Walking during the week        Bike Helmet: Not Asked        Seat Belt: Not Asked kg)  12/23/18 : 100 lb (45.4 kg)  04/21/18 : 99 lb 2 oz (45 kg)      ASSESSMENT AND PLAN:   66year old year old female who presents for a complete physical exam.     1. Annual physical exam  - continue to work on healthy diet and regular exercise  - Yanelis Fu

## 2020-06-16 ENCOUNTER — TELEPHONE (OUTPATIENT)
Dept: FAMILY MEDICINE CLINIC | Facility: CLINIC | Age: 75
End: 2020-06-16

## 2020-06-16 DIAGNOSIS — M81.0 OSTEOPOROSIS, UNSPECIFIED OSTEOPOROSIS TYPE, UNSPECIFIED PATHOLOGICAL FRACTURE PRESENCE: ICD-10-CM

## 2020-06-16 DIAGNOSIS — Z00.00 ROUTINE GENERAL MEDICAL EXAMINATION AT A HEALTH CARE FACILITY: Primary | ICD-10-CM

## 2020-06-16 NOTE — TELEPHONE ENCOUNTER
Patient called that she is still receiving bills for a Vitamin D that was drawn in Feb 2020. All labs coded as Z00.00.  Patient does not have Vitamin D deficiency, She is asking us to resubmit it as Wellness visit with high risk for Vitamin D deficiency, I

## 2020-06-16 NOTE — TELEPHONE ENCOUNTER
Left patient a detailed message that I am having the Vitamin D resubmitted under DX M81.0. I also reminded her insurance preferred lab is [facility], she will always have a higher cost by going to THE [facility]. I asked that she call back if she has any questions.

## 2020-09-03 ENCOUNTER — OFFICE VISIT (OUTPATIENT)
Dept: FAMILY MEDICINE CLINIC | Facility: CLINIC | Age: 75
End: 2020-09-03
Payer: COMMERCIAL

## 2020-09-03 VITALS
DIASTOLIC BLOOD PRESSURE: 72 MMHG | TEMPERATURE: 98 F | BODY MASS INDEX: 18.73 KG/M2 | WEIGHT: 99.19 LBS | SYSTOLIC BLOOD PRESSURE: 142 MMHG | HEIGHT: 61 IN

## 2020-09-03 DIAGNOSIS — N30.91 CYSTITIS WITH HEMATURIA: Primary | ICD-10-CM

## 2020-09-03 LAB
GLUCOSE (URINE DIPSTICK): 100 MG/DL
KETONES (URINE DIPSTICK): 40 MG/DL
MULTISTIX LOT#: ABNORMAL NUMERIC
PH, URINE: 8.5 (ref 4.5–8)
PROTEIN (URINE DIPSTICK): 300 MG/DL
SPECIFIC GRAVITY: 1.01 (ref 1–1.03)
UROBILINOGEN,SEMI-QN: 8 MG/DL (ref 0–1.9)

## 2020-09-03 PROCEDURE — 87086 URINE CULTURE/COLONY COUNT: CPT | Performed by: NURSE PRACTITIONER

## 2020-09-03 PROCEDURE — 81003 URINALYSIS AUTO W/O SCOPE: CPT | Performed by: NURSE PRACTITIONER

## 2020-09-03 PROCEDURE — 3008F BODY MASS INDEX DOCD: CPT | Performed by: NURSE PRACTITIONER

## 2020-09-03 PROCEDURE — 3077F SYST BP >= 140 MM HG: CPT | Performed by: NURSE PRACTITIONER

## 2020-09-03 PROCEDURE — 99213 OFFICE O/P EST LOW 20 MIN: CPT | Performed by: NURSE PRACTITIONER

## 2020-09-03 PROCEDURE — 3078F DIAST BP <80 MM HG: CPT | Performed by: NURSE PRACTITIONER

## 2020-09-03 RX ORDER — NITROFURANTOIN 25; 75 MG/1; MG/1
100 CAPSULE ORAL 2 TIMES DAILY
Qty: 10 CAPSULE | Refills: 0 | Status: SHIPPED | OUTPATIENT
Start: 2020-09-03 | End: 2020-09-08

## 2020-09-03 NOTE — PROGRESS NOTES
CHIEF COMPLAINT:   Patient presents with:  UTI: this morning      HPI:   Lam Mcconnell is a 66year old female who presents with symptoms of UTI. Complaining of urinary frequency, urgency, dysuria for 1 days. Symptoms have been persisting since onset. 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 Does not want to restart.      • Other and unspecified ovarian cyst 6/29/2012 PLAN: Meds as listed below. Comfort measures as described in Patient Instructions. Will send urine culture.      Meds & Refills for this Visit:  Requested Prescriptions     Signed Prescriptions Disp Refills   • Nitrofurantoin Monohyd Macro (MACROBID) 100 M · Belly (abdominal) discomfort. This is often in the lower belly above the pubic bone.   · Cloudy urine  · Strong- or bad-smelling urine  · Unable to urinate (urinary retention)  · Unable to hold urine in (urinary incontinence)  · Fever  · Loss of appetite · If you are given phenazopydridine to reduce burning with urination, it will make your urine a bright orange color. This can stain clothing. Care and prevention  These self-care steps can help prevent future infections:  · Drink plenty of fluids.  This he · Back or belly pain that gets worse  · Repeated vomiting, or unable to keep medicine down  · Weakness or dizziness  · Vaginal discharge  · Pain, redness, or swelling in the outer vaginal area (labia)  Val last reviewed this educational content on 11/

## 2020-09-08 ENCOUNTER — TELEPHONE (OUTPATIENT)
Dept: FAMILY MEDICINE CLINIC | Facility: CLINIC | Age: 75
End: 2020-09-08

## 2020-09-08 NOTE — TELEPHONE ENCOUNTER
Patient states that she went to [facility] on 9/3/2020 with blood/blood clots in her urine. She started an antibiotic which she took for 3 days. [facility] that there was no UTI.  She would like to know why she had the blood/blood cl

## 2020-09-09 NOTE — TELEPHONE ENCOUNTER
Future Appointments   Date Time Provider Sol Luna   9/10/2020  4:30 PM Rahel Wade DO [facility] [address]   10/30/2020 11:20 AM PF PAT RM2 PF MAMMO [place]

## 2020-09-10 ENCOUNTER — OFFICE VISIT (OUTPATIENT)
Dept: FAMILY MEDICINE CLINIC | Facility: CLINIC | Age: 75
End: 2020-09-10
Payer: COMMERCIAL

## 2020-09-10 VITALS
HEART RATE: 72 BPM | DIASTOLIC BLOOD PRESSURE: 80 MMHG | TEMPERATURE: 97 F | SYSTOLIC BLOOD PRESSURE: 130 MMHG | HEIGHT: 61 IN | WEIGHT: 101 LBS | RESPIRATION RATE: 16 BRPM | BODY MASS INDEX: 19.07 KG/M2

## 2020-09-10 DIAGNOSIS — R31.0 GROSS HEMATURIA: Primary | ICD-10-CM

## 2020-09-10 PROBLEM — R31.9 HEMATURIA: Status: ACTIVE | Noted: 2020-09-10

## 2020-09-10 LAB
BILIRUB UR QL STRIP.AUTO: NEGATIVE
COLOR UR AUTO: YELLOW
GLUCOSE UR STRIP.AUTO-MCNC: NEGATIVE MG/DL
KETONES UR STRIP.AUTO-MCNC: NEGATIVE MG/DL
LEUKOCYTE ESTERASE UR QL STRIP.AUTO: NEGATIVE
NITRITE UR QL STRIP.AUTO: NEGATIVE
PH UR STRIP.AUTO: 5 [PH] (ref 4.5–8)
PROT UR STRIP.AUTO-MCNC: NEGATIVE MG/DL
RBC UR QL AUTO: NEGATIVE
SP GR UR STRIP.AUTO: 1.02 (ref 1–1.03)
UROBILINOGEN UR STRIP.AUTO-MCNC: 2 MG/DL

## 2020-09-10 PROCEDURE — 81003 URINALYSIS AUTO W/O SCOPE: CPT | Performed by: FAMILY MEDICINE

## 2020-09-10 PROCEDURE — 3008F BODY MASS INDEX DOCD: CPT | Performed by: FAMILY MEDICINE

## 2020-09-10 PROCEDURE — 3079F DIAST BP 80-89 MM HG: CPT | Performed by: FAMILY MEDICINE

## 2020-09-10 PROCEDURE — 99213 OFFICE O/P EST LOW 20 MIN: CPT | Performed by: FAMILY MEDICINE

## 2020-09-10 PROCEDURE — 3075F SYST BP GE 130 - 139MM HG: CPT | Performed by: FAMILY MEDICINE

## 2020-09-10 NOTE — PROGRESS NOTES
HPI:   Lam Mcconnell is a 66year old female that presents for blood in urine Thursday 9/3. She went to [facility], UA showed +nitrites, lueks and blood. She was given abx, culture came back negative per patient.   She took took 3 of 5 days of macrobid and stopp by Does not apply route daily. , Disp: , Rfl:   •  Cyanocobalamin (VITAMIN B-12 OR), Take 1 tablet by mouth daily. , Disp: , Rfl:     REVIEW OF SYSTEMS:     Comprehensive ROS negative unless noted in HPI    PHYSICAL EXAM:   /80   Pulse 72   Temp 97.3 °

## 2020-09-10 NOTE — PATIENT INSTRUCTIONS
Hematuria: Possible Causes     Many things can lead to blood in the urine (hematuria). The blood may be seen with the eye (macroscopic or gross hematuria). Or it may only be seen when the urine is looked at under a microscope (microscopic hematuria).  Oft © 7682-2841 [address]. [address] [facility] [place]. All rights reserved. This information is not intended as a substitute for professional medical care. Always follow your healthcare professional's instructions.

## 2020-09-11 DIAGNOSIS — R31.0 GROSS HEMATURIA: Primary | ICD-10-CM

## 2020-09-14 ENCOUNTER — TELEPHONE (OUTPATIENT)
Dept: FAMILY MEDICINE CLINIC | Facility: CLINIC | Age: 75
End: 2020-09-14

## 2020-09-14 NOTE — TELEPHONE ENCOUNTER
Patient states she saw the doctor, did a UA and depending on results, would go from there. She says she got a call Friday pm to schedule a CT and didn't know anything about it, please advise.  An is very upset she didn't get a call from the office to g Pt is a 75 yo M presenting from home after a fall resulting in L Femoral neck fracture. PMH HTN, HLD, "fast" HR.   Pt seen and examined at bedside.   He states that last night he was walking when all of a sudden his L leg buckled at the knee causing him to fall. He denies any head traume or LOC. He states that his leg simply gave out. He denies any assoc focal leg weakness, numbness or tingling. He has L hip pain, worse with movement, sharp, currently 4/10 in severity. No radiation elsewhere. He has no other complaints at this time.   Denies past hx of MI/TIA/CVA/CHF/DM2. He has never had any adverse reaction to anesthesia. no family hx of adverse reactions to anesthesia. He has no other complaints at this time. He can ambulate 2 blocks without associated chest pain or SOB.   Of note he was noted to have DONA and reports having taken Motrin for the past few days every 4-6hrs.

## 2020-09-16 NOTE — TELEPHONE ENCOUNTER
Pt states she was contacted on Friday late afternoon regarding the ordered CT scan and pt was very upset as she new nothing about the test being ordered.   Pt asked why she was not notified, this writer apologized to pt and informed her that the order was p

## 2020-09-23 ENCOUNTER — HOSPITAL ENCOUNTER (OUTPATIENT)
Dept: CT IMAGING | Age: 75
Discharge: HOME | End: 2020-09-23
Attending: FAMILY MEDICINE
Payer: COMMERCIAL

## 2020-09-23 DIAGNOSIS — R31.0 GROSS HEMATURIA: ICD-10-CM

## 2020-09-23 LAB — CREAT BLD-MCNC: 0.7 MG/DL (ref 0.55–1.02)

## 2020-09-23 PROCEDURE — 74178 CT ABD&PLV WO CNTR FLWD CNTR: CPT | Performed by: FAMILY MEDICINE

## 2020-09-23 PROCEDURE — 76377 3D RENDER W/INTRP POSTPROCES: CPT | Performed by: FAMILY MEDICINE

## 2020-09-23 PROCEDURE — 82565 ASSAY OF CREATININE: CPT

## 2020-10-30 ENCOUNTER — HOSPITAL ENCOUNTER (OUTPATIENT)
Dept: MAMMOGRAPHY | Age: 75
Discharge: HOME | End: 2020-10-30
Attending: FAMILY MEDICINE
Payer: COMMERCIAL

## 2020-10-30 DIAGNOSIS — Z12.31 BREAST CANCER SCREENING BY MAMMOGRAM: ICD-10-CM

## 2020-10-30 PROCEDURE — 77063 BREAST TOMOSYNTHESIS BI: CPT | Performed by: FAMILY MEDICINE

## 2020-10-30 PROCEDURE — 77067 SCR MAMMO BI INCL CAD: CPT | Performed by: FAMILY MEDICINE

## 2021-02-23 ENCOUNTER — OFFICE VISIT (OUTPATIENT)
Dept: FAMILY MEDICINE CLINIC | Facility: CLINIC | Age: 76
End: 2021-02-23
Payer: COMMERCIAL

## 2021-02-23 VITALS
DIASTOLIC BLOOD PRESSURE: 82 MMHG | WEIGHT: 96.38 LBS | TEMPERATURE: 97 F | HEIGHT: 61 IN | BODY MASS INDEX: 18.2 KG/M2 | HEART RATE: 83 BPM | OXYGEN SATURATION: 100 % | SYSTOLIC BLOOD PRESSURE: 138 MMHG | RESPIRATION RATE: 16 BRPM

## 2021-02-23 DIAGNOSIS — R03.0 ELEVATED BLOOD PRESSURE READING: ICD-10-CM

## 2021-02-23 DIAGNOSIS — E78.2 MIXED HYPERLIPIDEMIA: ICD-10-CM

## 2021-02-23 DIAGNOSIS — R73.03 PRE-DIABETES: ICD-10-CM

## 2021-02-23 DIAGNOSIS — J45.40 MODERATE PERSISTENT ASTHMA WITHOUT COMPLICATION: ICD-10-CM

## 2021-02-23 DIAGNOSIS — Z12.31 BREAST CANCER SCREENING BY MAMMOGRAM: ICD-10-CM

## 2021-02-23 DIAGNOSIS — Z00.00 ANNUAL PHYSICAL EXAM: Primary | ICD-10-CM

## 2021-02-23 DIAGNOSIS — Z13.31 NEGATIVE DEPRESSION SCREENING: ICD-10-CM

## 2021-02-23 PROCEDURE — 3008F BODY MASS INDEX DOCD: CPT | Performed by: FAMILY MEDICINE

## 2021-02-23 PROCEDURE — 3075F SYST BP GE 130 - 139MM HG: CPT | Performed by: FAMILY MEDICINE

## 2021-02-23 PROCEDURE — 99397 PER PM REEVAL EST PAT 65+ YR: CPT | Performed by: FAMILY MEDICINE

## 2021-02-23 PROCEDURE — 3079F DIAST BP 80-89 MM HG: CPT | Performed by: FAMILY MEDICINE

## 2021-02-23 RX ORDER — ALBUTEROL SULFATE 90 UG/1
AEROSOL, METERED RESPIRATORY (INHALATION)
Qty: 1 INHALER | Refills: 2 | Status: SHIPPED | OUTPATIENT
Start: 2021-02-23 | End: 2021-06-01

## 2021-02-23 RX ORDER — ATORVASTATIN CALCIUM 40 MG/1
40 TABLET, FILM COATED ORAL
Qty: 90 TABLET | Refills: 3 | Status: SHIPPED | OUTPATIENT
Start: 2021-02-23 | End: 2021-06-01

## 2021-02-23 NOTE — PATIENT INSTRUCTIONS
Blood Pressure  Goal < 150/90  Check 1-2 times a day for a couple weeks; email or call me with log   Thank you for allowing me to participate in your care today. I will contact you with any results from today's visit.   Lab results are typically availabl depending on your risk factors.    Cervical cancer Only women who had abnormal screening results before age 72 Talk with your healthcare provider   Chlamydia Women at increased risk for infection At routine exams   Colorectal cancer All women over the age o adults without symptoms.    Tuberculosis Women at increased risk for infection–talk with your healthcare provider Ask your healthcare provider   Vision All women in this age group Every 1 to 2 years; if you have a chronic health condition, ask your healthc of cardiovascular disease (CVD) and colorectal cancer in adults ages 61 to 71 who have at least a 10% risk of getting CVD within the next 10 years.   People who are not at increased risk for bleeding, have a life expectancy of at least 10 years, and are nargis

## 2021-02-23 NOTE — PROGRESS NOTES
Yusef Louie is a 66year old female that presents for annual physical exam.     Patient presents with:  Physical: PHQ2: 0, CSSR: Neg  Blood Pressure: readings are: 2/18: 151/83, 150/79 2/20: right wpo967/97, right rul401/85, left dps152/83    STI testin (DULERA) 100-5 MCG/ACT Inhalation Aerosol, INHALE 2 PUFFS ORALLY TWICE DAILY, Disp: 3 Inhaler, Rfl: 3  •  Calcium Carbonate-Vitamin D (CALCIUM-D) 600-400 MG-UNIT Oral Tab, Take  by mouth daily. , Disp: , Rfl:   •  Multiple Vitamins-Minerals (MULTIVITAMIN OR [employer]        Comment:     Social Needs      Financial resource strain: Not on file      Food insecurity        Worry: Not on file        Inability: Not on file      Transportation needs        Medical: Not on file        Non-medical: Not congestion, sinus pain or ST  LUNGS: denies shortness of breath with exertion  CARDIOVASCULAR: denies chest pain on exertion  GI: denies abdominal pain, denies heartburn  : denies dysuria, vaginal discharge or itching  MUSCULOSKELETAL: denies back pain work on [employer] and regular exercise  - breast self awareness taught    2. Negative depression screening    3. Breast cancer screening by mammogram  - [facility] DUTCH 2D+3D SCREENING BILAT (CPT=[id number]); Future    4. Pre-diabetes  - HEMOGLOBIN A1C    5.

## 2021-02-24 ENCOUNTER — TELEPHONE (OUTPATIENT)
Dept: FAMILY MEDICINE CLINIC | Facility: CLINIC | Age: 76
End: 2021-02-24

## 2021-02-24 NOTE — TELEPHONE ENCOUNTER
5. Elevated blood pressure reading  - pt to check BP at home goal < 150/90 and email log in 2 weeks, if consistently elevated, will consider BP meds

## 2021-02-24 NOTE — TELEPHONE ENCOUNTER
Pt bought a blood pressure cuff per Dr. Enedelia Swanson. Pt was told by the pharmacy to bring it to PCP office. Please advise pt. She states Dr. Enedelia Swanson never mentioned for her to bring it to the office.

## 2021-02-24 NOTE — TELEPHONE ENCOUNTER
Left detailed message per HIPPA form advising of Dr. Reyes [place] instructions and does not need to bring BP machine into office

## 2021-02-27 LAB
ABSOLUTE BASOPHILS: 32 CELLS/UL (ref 0–200)
ABSOLUTE EOSINOPHILS: 99 CELLS/UL (ref 15–500)
ABSOLUTE LYMPHOCYTES: 1017 CELLS/UL (ref 850–3900)
ABSOLUTE MONOCYTES: 387 CELLS/UL (ref 200–950)
ABSOLUTE NEUTROPHILS: 2966 CELLS/UL (ref 1500–7800)
ALBUMIN/GLOBULIN RATIO: 1.9 (CALC) (ref 1–2.5)
ALBUMIN: 4.6 G/DL (ref 3.6–5.1)
ALKALINE PHOSPHATASE: 91 U/L (ref 37–153)
ALT: 15 U/L (ref 6–29)
AST: 21 U/L (ref 10–35)
BASOPHILS: 0.7 %
BILIRUBIN, TOTAL: 0.7 MG/DL (ref 0.2–1.2)
BUN: 12 MG/DL (ref 7–25)
CALCIUM: 9.8 MG/DL (ref 8.6–10.4)
CARBON DIOXIDE: 28 MMOL/L (ref 20–32)
CHLORIDE: 107 MMOL/L (ref 98–110)
CHOL/HDLC RATIO: 2.7 (CALC)
CHOLESTEROL, TOTAL: 174 MG/DL
CREATININE: 0.76 MG/DL (ref 0.6–0.93)
EGFR IF AFRICN AM: 88 ML/MIN/1.73M2
EGFR IF NONAFRICN AM: 76 ML/MIN/1.73M2
EOSINOPHILS: 2.2 %
GLOBULIN: 2.4 G/DL (CALC) (ref 1.9–3.7)
GLUCOSE: 107 MG/DL (ref 65–99)
HDL CHOLESTEROL: 64 MG/DL
HEMATOCRIT: 40.5 % (ref 35–45)
HEMOGLOBIN A1C: 5.5 % OF TOTAL HGB
HEMOGLOBIN: 13.3 G/DL (ref 11.7–15.5)
LDL-CHOLESTEROL: 87 MG/DL (CALC)
LYMPHOCYTES: 22.6 %
MCH: 28.1 PG (ref 27–33)
MCHC: 32.8 G/DL (ref 32–36)
MCV: 85.4 FL (ref 80–100)
MONOCYTES: 8.6 %
MPV: 12.1 FL (ref 7.5–12.5)
NEUTROPHILS: 65.9 %
NON-HDL CHOLESTEROL: 110 MG/DL (CALC)
PLATELET COUNT: 282 THOUSAND/UL (ref 140–400)
POTASSIUM: 4.3 MMOL/L (ref 3.5–5.3)
PROTEIN, TOTAL: 7 G/DL (ref 6.1–8.1)
RDW: 13.3 % (ref 11–15)
RED BLOOD CELL COUNT: 4.74 MILLION/UL (ref 3.8–5.1)
SODIUM: 143 MMOL/L (ref 135–146)
TRIGLYCERIDES: 136 MG/DL
TSH W/REFLEX TO FT4: 3.61 MIU/L (ref 0.4–4.5)
WHITE BLOOD CELL COUNT: 4.5 THOUSAND/UL (ref 3.8–10.8)

## 2021-03-09 ENCOUNTER — TELEPHONE (OUTPATIENT)
Dept: FAMILY MEDICINE CLINIC | Facility: CLINIC | Age: 76
End: 2021-03-09

## 2021-03-09 NOTE — TELEPHONE ENCOUNTER
Reviewed labs results with pt, explaining lipid panel numbers as well as CMP glucose and A1C. Pt states per her OV she was to be monitoring her B/P, which she has been but pt feels it has been running higher on some days than it \"should\" be.  Pt states sh

## 2021-03-09 NOTE — TELEPHONE ENCOUNTER
Patient states she had labs done @[facility] 2/26/2021, sees [facility] results, please call.

## 2021-03-09 NOTE — TELEPHONE ENCOUNTER
See ANAI LINDER, pt started her B/P monitoring log this week. She will contact the office with her 2 week log.

## 2021-03-13 DIAGNOSIS — Z23 NEED FOR VACCINATION: ICD-10-CM

## 2021-03-21 ENCOUNTER — PATIENT MESSAGE (OUTPATIENT)
Dept: FAMILY MEDICINE CLINIC | Facility: CLINIC | Age: 76
End: 2021-03-21

## 2021-03-22 NOTE — TELEPHONE ENCOUNTER
From: Gary Sutton  To:  Parvin Moser DO  Sent: 3/21/2021 11:34 AM CDT  Subject: Other    Blood pressure results  03/06 151/74 at 6:54am  03/07 153/71 at 6:55am  03/08 140/81 at 6:25am  03/09 133/85 at 6:27am  03/10 153/78 at 6:25am  03/11 132/68 at 6

## 2021-03-23 ENCOUNTER — PATIENT MESSAGE (OUTPATIENT)
Dept: FAMILY MEDICINE CLINIC | Facility: CLINIC | Age: 76
End: 2021-03-23

## 2021-03-23 RX ORDER — LISINOPRIL 5 MG/1
5 TABLET ORAL DAILY
Qty: 30 TABLET | Refills: 1 | Status: SHIPPED | OUTPATIENT
Start: 2021-03-23 | End: 2021-04-28

## 2021-03-24 NOTE — TELEPHONE ENCOUNTER
From: Tito Reyna DO  To: Charles Membreno  Sent: 3/23/2021 9:56 PM CDT  Subject: blood pressure    I think starting a low dose of blood pressure medication (lisinopril) would be very reasonable.  I will send 30 days to local pharmacy for you, and maybe

## 2021-04-28 ENCOUNTER — OFFICE VISIT (OUTPATIENT)
Dept: FAMILY MEDICINE CLINIC | Facility: CLINIC | Age: 76
End: 2021-04-28
Payer: COMMERCIAL

## 2021-04-28 ENCOUNTER — TELEPHONE (OUTPATIENT)
Dept: FAMILY MEDICINE CLINIC | Facility: CLINIC | Age: 76
End: 2021-04-28

## 2021-04-28 VITALS
DIASTOLIC BLOOD PRESSURE: 82 MMHG | BODY MASS INDEX: 17.84 KG/M2 | HEIGHT: 61 IN | HEART RATE: 61 BPM | TEMPERATURE: 97 F | WEIGHT: 94.5 LBS | OXYGEN SATURATION: 98 % | SYSTOLIC BLOOD PRESSURE: 140 MMHG | RESPIRATION RATE: 16 BRPM

## 2021-04-28 DIAGNOSIS — I10 ESSENTIAL HYPERTENSION: ICD-10-CM

## 2021-04-28 DIAGNOSIS — I10 ESSENTIAL HYPERTENSION: Primary | ICD-10-CM

## 2021-04-28 PROCEDURE — 99213 OFFICE O/P EST LOW 20 MIN: CPT | Performed by: FAMILY MEDICINE

## 2021-04-28 PROCEDURE — 3008F BODY MASS INDEX DOCD: CPT | Performed by: FAMILY MEDICINE

## 2021-04-28 PROCEDURE — 3077F SYST BP >= 140 MM HG: CPT | Performed by: FAMILY MEDICINE

## 2021-04-28 PROCEDURE — 3079F DIAST BP 80-89 MM HG: CPT | Performed by: FAMILY MEDICINE

## 2021-04-28 RX ORDER — LISINOPRIL 5 MG/1
5 TABLET ORAL DAILY
Qty: 90 TABLET | Refills: 3 | Status: SHIPPED | OUTPATIENT
Start: 2021-04-28 | End: 2021-04-28

## 2021-04-28 RX ORDER — LISINOPRIL 5 MG/1
5 TABLET ORAL DAILY
Qty: 30 TABLET | Refills: 0 | Status: SHIPPED | OUTPATIENT
Start: 2021-04-28 | End: 2021-09-01 | Stop reason: SINTOL

## 2021-04-28 NOTE — PROGRESS NOTES
HPI:   Robbie James is a 66year old female.     Patient presents with:  Blood Pressure: f/up - states the last few days seems to be elevating - taking Lisinsopril 5mg  - BP today 159/72, 155/72  Abdominal Pain: left side - states was moving furniture las Carbonate-Vitamin D (CALCIUM-D) 600-400 MG-UNIT Oral Tab, Take  by mouth daily. , Disp: , Rfl:   •  Multiple Vitamins-Minerals (MULTIVITAMIN OR), Take 1 tablet by mouth daily. , Disp: , Rfl:   •  Aspirin (ASPIR-81 OR), Take 1 tablet by mouth daily. , Disp: , physical .    Latrice Lane DO  Family Medicine   4/28/2021  10:48 AM

## 2021-06-01 DIAGNOSIS — J45.40 MODERATE PERSISTENT ASTHMA WITHOUT COMPLICATION: ICD-10-CM

## 2021-06-01 DIAGNOSIS — E78.2 MIXED HYPERLIPIDEMIA: ICD-10-CM

## 2021-06-01 RX ORDER — ALBUTEROL SULFATE 90 UG/1
AEROSOL, METERED RESPIRATORY (INHALATION)
Qty: 3 EACH | Refills: 1 | Status: SHIPPED | OUTPATIENT
Start: 2021-06-01

## 2021-06-01 RX ORDER — ATORVASTATIN CALCIUM 40 MG/1
40 TABLET, FILM COATED ORAL
Qty: 90 TABLET | Refills: 1 | Status: SHIPPED | OUTPATIENT
Start: 2021-06-01 | End: 2021-09-01

## 2021-06-01 NOTE — TELEPHONE ENCOUNTER
Cholesterol Medication Protocol [id number]/01/2021 09:01 AM   ALT < 80 Protocol Details    ALT resulted within past year     Lipid panel within past 12 months     Appointment within past 12 or next 3 months      Requested Prescriptions     Pending Prescripti

## 2021-06-14 ENCOUNTER — TELEPHONE (OUTPATIENT)
Dept: FAMILY MEDICINE CLINIC | Facility: CLINIC | Age: 76
End: 2021-06-14

## 2021-06-14 DIAGNOSIS — I10 ESSENTIAL HYPERTENSION: Primary | ICD-10-CM

## 2021-06-14 NOTE — TELEPHONE ENCOUNTER
Spoke with pt and provided referral information for Dr. Lily Frost, pt wrote down information and will call tho schedule appointment.  Pt also requested to schedule appointment with Dr. Sunny Cote stating she knows specialist can take time to get into and states

## 2021-06-14 NOTE — TELEPHONE ENCOUNTER
Pt states that her BP has been up and down and up and down, she is currently taking medication. Highest reading was 170/78 on May 29 and today was 127/61. She would like to see a cardiologist and would like to know who is recommended.  Should she come in fo

## 2021-06-17 ENCOUNTER — TELEPHONE (OUTPATIENT)
Dept: CARDIOLOGY | Age: 76
End: 2021-06-17

## 2021-06-21 ENCOUNTER — TELEPHONE (OUTPATIENT)
Dept: FAMILY MEDICINE CLINIC | Facility: CLINIC | Age: 76
End: 2021-06-21

## 2021-06-21 NOTE — TELEPHONE ENCOUNTER
Spoke to patient. Patient advised to keep apt with Dr. Lovely Elias- now with [facility] cardiovascular. Canceled appt for this week with Dr Milton Hardy since patient is not due until end of July for medicare appt. Patient states her BP is staying stable.

## 2021-06-21 NOTE — TELEPHONE ENCOUNTER
Pt is scheduled to see Dr. Estefani Watkins on 7-23-21. He will be going out on his own and wont be a part of [facility]. Should she keep the appt with him? Should she keep the appt with Dr. Shiloh Lazar?   Future Appointments   Date Time Provider Sol Luna   6/2

## 2021-06-28 ENCOUNTER — TELEPHONE (OUTPATIENT)
Dept: FAMILY MEDICINE CLINIC | Facility: CLINIC | Age: 76
End: 2021-06-28

## 2021-06-28 ENCOUNTER — OFFICE VISIT (OUTPATIENT)
Dept: FAMILY MEDICINE CLINIC | Facility: CLINIC | Age: 76
End: 2021-06-28
Payer: COMMERCIAL

## 2021-06-28 VITALS
TEMPERATURE: 98 F | RESPIRATION RATE: 20 BRPM | HEIGHT: 61 IN | WEIGHT: 95.38 LBS | HEART RATE: 81 BPM | SYSTOLIC BLOOD PRESSURE: 130 MMHG | BODY MASS INDEX: 18.01 KG/M2 | DIASTOLIC BLOOD PRESSURE: 58 MMHG | OXYGEN SATURATION: 98 %

## 2021-06-28 DIAGNOSIS — J02.9 SORE THROAT: Primary | ICD-10-CM

## 2021-06-28 DIAGNOSIS — R05.9 COUGH IN ADULT: ICD-10-CM

## 2021-06-28 PROCEDURE — 87880 STREP A ASSAY W/OPTIC: CPT | Performed by: NURSE PRACTITIONER

## 2021-06-28 PROCEDURE — 3075F SYST BP GE 130 - 139MM HG: CPT | Performed by: NURSE PRACTITIONER

## 2021-06-28 PROCEDURE — 3078F DIAST BP <80 MM HG: CPT | Performed by: NURSE PRACTITIONER

## 2021-06-28 PROCEDURE — 3008F BODY MASS INDEX DOCD: CPT | Performed by: NURSE PRACTITIONER

## 2021-06-28 PROCEDURE — 99213 OFFICE O/P EST LOW 20 MIN: CPT | Performed by: NURSE PRACTITIONER

## 2021-06-28 RX ORDER — BENZONATATE 200 MG/1
200 CAPSULE ORAL 3 TIMES DAILY PRN
Qty: 30 CAPSULE | Refills: 0 | Status: SHIPPED | OUTPATIENT
Start: 2021-06-28 | End: 2021-07-08

## 2021-06-28 RX ORDER — BENZONATATE 200 MG/1
200 CAPSULE ORAL 3 TIMES DAILY PRN
Qty: 30 CAPSULE | Refills: 0 | Status: SHIPPED | OUTPATIENT
Start: 2021-06-28 | End: 2021-06-28

## 2021-06-28 NOTE — TELEPHONE ENCOUNTER
Spoke to pt, c/o cough and fever since Friday. States no fever today but still with cough and sore throat. Pt's voice is very hoarse as well.   No SOB  Denies loss of taste and smell  Pt is fully vaccinated against Covid-19 and denies any close contact  Adv

## 2021-06-28 NOTE — TELEPHONE ENCOUNTER
Pt states that she has a really deep cough. She had a fever on Saturday and Friday. She also has a sore throat since last thursday. Patient still wears a mask when she goes to a store. Patient's voice affected as well.  Patient can taste and smell, and has

## 2021-06-28 NOTE — PROGRESS NOTES
HPI:   Jorge Luis Gonzalez is a 64year old female who presents with ill symptoms for  5  days. Patient reports sore throat, congestion, dry cough, chest pain from coughing, ear pain, meds have been mildly helpful, denies fever.  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 Does not want to restart.      • Other and unspecified ovarian cyst 6/29/2012   • Other psoriasis    • Pre-diabetes    • Wea Range    Strep Grp A Screen negative Negative    Control Line Present with a clear background (yes/no) yes Yes/No    Kit Lot # [id number] Numeric    Kit Expiration Date 1/24/22 Date         ASSESSMENT AND PLAN:    PLAN:Caroline was seen today for cough.     Diag pain/comfort if needed. You may try your Albuterol inhaler if needed for cough, rinse mouth after use. · No work or school until fever free for 24 hours.   · Follow up in 10-14 days after illness started with primary care if symptoms not complete resolved

## 2021-06-28 NOTE — PATIENT INSTRUCTIONS
Self care for viral illnesses:  Benzonatate perles every eight hours with large glass of cool liquids for cough as needed. This will take the place of Robitussin for cough and sore throat. May make you drowsy. · Salt water gargles (1 tsp.  Salt in 6 oz davonte

## 2021-07-22 ENCOUNTER — TELEPHONE (OUTPATIENT)
Dept: FAMILY MEDICINE CLINIC | Facility: CLINIC | Age: 76
End: 2021-07-22

## 2021-07-23 NOTE — TELEPHONE ENCOUNTER
Spoke to pt, states last night was the first night in 4 weeks that she did not cough. States she has been taking Robitussin and cough drops. States during the day her cough is dry but at night it's more a tickle. +PND  No other symptoms.  No fever, no sin

## 2021-08-04 ENCOUNTER — TELEPHONE (OUTPATIENT)
Dept: FAMILY MEDICINE CLINIC | Facility: CLINIC | Age: 76
End: 2021-08-04

## 2021-08-04 DIAGNOSIS — Z00.00 LABORATORY EXAM ORDERED AS PART OF ROUTINE GENERAL MEDICAL EXAMINATION: Primary | ICD-10-CM

## 2021-08-04 NOTE — TELEPHONE ENCOUNTER
Patient states she wants to have labs prior to the visit, will let us know where she's going for labwork later today.

## 2021-08-04 NOTE — TELEPHONE ENCOUNTER
Pt will need labs sent to [facility]. Pt does not want to have the VItamin D ordered, as her insurance will not cover it.    Future Appointments   Date Time Provider Sol Luna   9/1/2021  9:30 AM Danya Wade DO [facility] [address] [facility] [address]     Pt is still c

## 2021-08-05 ENCOUNTER — TELEPHONE (OUTPATIENT)
Dept: FAMILY MEDICINE CLINIC | Facility: CLINIC | Age: 76
End: 2021-08-05

## 2021-08-05 NOTE — TELEPHONE ENCOUNTER
Pt has had cough for 7 weeks. She would like to know if this is excessive She will start taking Robitussin. Patient states that she went to see cardio, Dr. Juaquin Powers, who ordered multiple tests for the patient.  She states that she needs to do an ECHO, labs

## 2021-08-05 NOTE — TELEPHONE ENCOUNTER
Pt states she felt very overwhelmed after her appointment with Dr. Lazara oRyal and all of the ordered tests. Pt states she has considered herself a healthy person and all of this testing is intimidating.  Pt asking if the ordered tests are in her best interest, t

## 2021-08-06 LAB
AMB EXT CHOL/HDL RATIO: 2.5
AMB EXT CHOLESTEROL, TOTAL: 168 MG/DL
AMB EXT GLUCOSE: 93 MG/DL
AMB EXT HDL CHOLESTEROL: 67 MG/DL
AMB EXT HEMATOCRIT: 37.1
AMB EXT HEMOGLOBIN: 12.5
AMB EXT LDL CHOLESTEROL, DIRECT: 85 MG/DL
AMB EXT MCV: 84.5
AMB EXT NON HDL CHOL: 101 MG/DL
AMB EXT PLATELETS: 248
AMB EXT TRIGLYCERIDES: 73 MG/DL
AMB EXT TSH: 2.53 MIU/ML
AMB EXT WBC: 4.6 X10(3)UL

## 2021-08-31 ENCOUNTER — TELEPHONE (OUTPATIENT)
Dept: FAMILY MEDICINE CLINIC | Facility: CLINIC | Age: 76
End: 2021-08-31

## 2021-08-31 NOTE — TELEPHONE ENCOUNTER
Pt calling in regards to all the testing that she had done with cardiology-Dr. Letitia Tripathi.  She had a nuclear stress test (and had to go twice for this test) and an ECHO (which was a very uncomfortable experience in terms of being alone with male tech during

## 2021-09-01 ENCOUNTER — OFFICE VISIT (OUTPATIENT)
Dept: FAMILY MEDICINE CLINIC | Facility: CLINIC | Age: 76
End: 2021-09-01
Payer: COMMERCIAL

## 2021-09-01 VITALS
BODY MASS INDEX: 17.75 KG/M2 | DIASTOLIC BLOOD PRESSURE: 78 MMHG | HEIGHT: 61 IN | HEART RATE: 76 BPM | RESPIRATION RATE: 16 BRPM | WEIGHT: 94 LBS | SYSTOLIC BLOOD PRESSURE: 124 MMHG

## 2021-09-01 DIAGNOSIS — E78.2 MIXED HYPERLIPIDEMIA: ICD-10-CM

## 2021-09-01 DIAGNOSIS — J45.20 MILD INTERMITTENT ASTHMA WITHOUT COMPLICATION: ICD-10-CM

## 2021-09-01 DIAGNOSIS — M81.0 AGE-RELATED OSTEOPOROSIS WITHOUT CURRENT PATHOLOGICAL FRACTURE: ICD-10-CM

## 2021-09-01 DIAGNOSIS — Z12.31 BREAST CANCER SCREENING BY MAMMOGRAM: ICD-10-CM

## 2021-09-01 DIAGNOSIS — Z78.0 POSTMENOPAUSAL: ICD-10-CM

## 2021-09-01 DIAGNOSIS — I44.7 LEFT BUNDLE BRANCH BLOCK: ICD-10-CM

## 2021-09-01 DIAGNOSIS — I35.8 AORTIC VALVE SCLEROSIS: ICD-10-CM

## 2021-09-01 DIAGNOSIS — I10 ESSENTIAL HYPERTENSION: ICD-10-CM

## 2021-09-01 DIAGNOSIS — Z00.00 ENCOUNTER FOR ANNUAL HEALTH EXAMINATION: Primary | ICD-10-CM

## 2021-09-01 DIAGNOSIS — K21.9 GASTROESOPHAGEAL REFLUX DISEASE WITHOUT ESOPHAGITIS: ICD-10-CM

## 2021-09-01 PROBLEM — R31.9 HEMATURIA: Status: RESOLVED | Noted: 2020-09-10 | Resolved: 2021-09-01

## 2021-09-01 PROCEDURE — 96160 PT-FOCUSED HLTH RISK ASSMT: CPT | Performed by: FAMILY MEDICINE

## 2021-09-01 PROCEDURE — G0402 INITIAL PREVENTIVE EXAM: HCPCS | Performed by: FAMILY MEDICINE

## 2021-09-01 PROCEDURE — 3074F SYST BP LT 130 MM HG: CPT | Performed by: FAMILY MEDICINE

## 2021-09-01 PROCEDURE — 99397 PER PM REEVAL EST PAT 65+ YR: CPT | Performed by: FAMILY MEDICINE

## 2021-09-01 PROCEDURE — 3008F BODY MASS INDEX DOCD: CPT | Performed by: FAMILY MEDICINE

## 2021-09-01 PROCEDURE — 3078F DIAST BP <80 MM HG: CPT | Performed by: FAMILY MEDICINE

## 2021-09-01 RX ORDER — ATORVASTATIN CALCIUM 40 MG/1
40 TABLET, FILM COATED ORAL
Qty: 90 TABLET | Refills: 1 | Status: SHIPPED | OUTPATIENT
Start: 2021-09-01 | End: 2021-09-01

## 2021-09-01 RX ORDER — LOSARTAN POTASSIUM 25 MG/1
TABLET ORAL
Qty: 90 TABLET | Refills: 0 | OUTPATIENT
Start: 2021-09-01

## 2021-09-01 RX ORDER — ATORVASTATIN CALCIUM 40 MG/1
40 TABLET, FILM COATED ORAL
Qty: 90 TABLET | Refills: 1 | Status: SHIPPED | OUTPATIENT
Start: 2021-09-01 | End: 2022-02-01

## 2021-09-01 RX ORDER — LOSARTAN POTASSIUM 25 MG/1
25 TABLET ORAL DAILY
Qty: 30 TABLET | Refills: 1 | Status: SHIPPED | OUTPATIENT
Start: 2021-09-01 | End: 2021-10-21

## 2021-09-01 NOTE — PATIENT INSTRUCTIONS
Stop lisinopril and change to losartan  Blood pressure goal < 140/90  Email BP log in 2-4 weeks        [facility]   Tests on this list are recommended by your physician but may not be covered, or covered at this frequency, by your glucocorticoid medication use (Steroids) Last Dexa Scan:     02/25/2012      No recommendations at this time   Pap and Pelvic    Pap   Covered every 2 years for women at normal risk;  Annually if at high risk -  No recommendations at this time    Chlamydia website. http://www. [place]ph.state. [place]/public/books/advin.htm  A link to the [employer]meet. This site has a lot of good information including definitions of the different types of Advance Directives.  It also has the [facility] forms available

## 2021-09-01 NOTE — PROGRESS NOTES
HPI:   hSekhar Nix is a 64year old female who presents for a MA (Medicare Advantage) [facility] (Once per calendar year). Labs from Cardiology reviewed, stable, abstracted. Due for mammogram and DEXA scan. Diet healthy, walks for exercise.   Had Family/surrogate (if present), and forms available to patient in AVS     She does NOT have a Power of  for [employer] on file in [address]    Advance care planning including the explanation and discussion of advance directives standard forms performed (40 mg total) by mouth once daily. Albuterol Sulfate HFA (VENTOLIN HFA) 108 (90 Base) MCG/ACT Inhalation Aero Soln, Use 2 puffs every 6 hours  as needed  Calcium Carbonate-Vitamin D (CALCIUM-D) 600-400 MG-UNIT Oral Tab, Take  by mouth daily.   Multiple Vit denies headaches  PSYCHE: denies depression or anxiety  HEMATOLOGIC: denies hx of anemia  ENDOCRINE: denies thyroid history  ALL/ASTHMA: denies hx of allergy or asthma    EXAM:   /78   Pulse 76   Resp 16   Ht 5' 1\" (1.549 m)   Wt 94 lb (42.6 kg)   B Cervical, supraclavicular, and axillary nodes normal   Neurologic: Normal       Vaccination History     Immunization History   Administered Date(s) Administered   • Covid-19 Vaccine Pfizer 30 mcg/0.3 ml 03/11/2021, 04/01/2021   • Fluzone Vaccine Medicare ( lost more than 10 pounds without trying?: 2 - No  Has your appetite been poor?: No  How does the patient maintain a good energy level?: Daily Walks  How would you describe your daily physical activity?: Moderate  How would you describe your current health recommendations at this time    Flexible Sigmoidoscopy   Covered every 4 years -    Fecal Occult Blood Test Covered annually -   Bone Density Screening    Bone density screening    Covered every 2 years after age 72 if diagnosed with risk of osteoporosis o Date    BUN 12 02/26/2021       Drug Serum Conc Annually No results found for: DIGOXIN, DIG, VALP

## 2021-09-02 ENCOUNTER — TELEPHONE (OUTPATIENT)
Dept: FAMILY MEDICINE CLINIC | Facility: CLINIC | Age: 76
End: 2021-09-02

## 2021-09-02 NOTE — TELEPHONE ENCOUNTER
Informed pt that Dr. Ashok Brooke submitted a formal medical record request to [facility] and it can take 10-14 days for Dr. Ashok Brooke to receive pt's records, pt verbalized understanding.  Pt states she spoke with [facility] as well and they informed pt their fax machine is down

## 2021-09-02 NOTE — TELEPHONE ENCOUNTER
Requested [facility] fax us her test results.  She had a bad expirence with them and is asking if we can make sure we get the results and please let her know

## 2021-09-07 ENCOUNTER — TELEPHONE (OUTPATIENT)
Dept: FAMILY MEDICINE CLINIC | Facility: CLINIC | Age: 76
End: 2021-09-07

## 2021-09-22 NOTE — TELEPHONE ENCOUNTER
Patient calling to check on records request from [address]. Asking if PCP has received nay records, request was placed since 9-2. Patient will try to call also to have records sent.

## 2021-09-22 NOTE — TELEPHONE ENCOUNTER
Informed pt that we have not received medical records from [facility]. Pt states she will call [facility] again tomorrow.

## 2021-09-26 ENCOUNTER — HOSPITAL ENCOUNTER (EMERGENCY)
Age: 76
Discharge: HOME | End: 2021-09-26
Attending: EMERGENCY MEDICINE
Payer: MEDICARE

## 2021-09-26 ENCOUNTER — APPOINTMENT (OUTPATIENT)
Dept: GENERAL RADIOLOGY | Age: 76
End: 2021-09-26
Payer: MEDICARE

## 2021-09-26 VITALS
WEIGHT: 93.94 LBS | HEART RATE: 89 BPM | DIASTOLIC BLOOD PRESSURE: 79 MMHG | OXYGEN SATURATION: 100 % | RESPIRATION RATE: 16 BRPM | BODY MASS INDEX: 18 KG/M2 | TEMPERATURE: 99 F | SYSTOLIC BLOOD PRESSURE: 161 MMHG

## 2021-09-26 DIAGNOSIS — S52.532A CLOSED COLLES' FRACTURE OF LEFT RADIUS, INITIAL ENCOUNTER: Primary | ICD-10-CM

## 2021-09-26 PROCEDURE — 29125 APPL SHORT ARM SPLINT STATIC: CPT

## 2021-09-26 PROCEDURE — 99284 EMERGENCY DEPT VISIT MOD MDM: CPT

## 2021-09-26 PROCEDURE — 73110 X-RAY EXAM OF WRIST: CPT | Performed by: EMERGENCY MEDICINE

## 2021-09-26 RX ORDER — IBUPROFEN 600 MG/1
600 TABLET ORAL ONCE
Status: COMPLETED | OUTPATIENT
Start: 2021-09-26 | End: 2021-09-26

## 2021-09-26 NOTE — ED PROVIDER NOTES
Patient Seen in: THE [facility] Emergency Department In [place]      History   Patient presents with:  Fall  Arm or Hand Injury    Stated Complaint: fall at 1630 today on sidewalk, c/o lt wrist pain and rt great toe    Subjective:   HPI    Patient fell around rt great toe  Other systems are as noted in HPI. Constitutional and vital signs reviewed. All other systems reviewed and negative except as noted above.     Physical Exam     ED Triage Vitals [09/26/21 0119]   BP (!) 161/79   Pulse 79   Resp 16   Temp neurovascular exam was noted to be intact. MDM      Distal radius fracture after mechanical fall.   Minimal displacement, hopefully can be managed nonoperatively but patient made aware that she needs to follow-up with orthopedic surgery for their

## 2021-09-27 ENCOUNTER — OFFICE VISIT (OUTPATIENT)
Dept: ORTHOPEDICS CLINIC | Facility: CLINIC | Age: 76
End: 2021-09-27
Payer: COMMERCIAL

## 2021-09-27 ENCOUNTER — TELEPHONE (OUTPATIENT)
Dept: ORTHOPEDICS CLINIC | Facility: CLINIC | Age: 76
End: 2021-09-27

## 2021-09-27 VITALS — OXYGEN SATURATION: 98 % | HEART RATE: 80 BPM

## 2021-09-27 DIAGNOSIS — S52.532A CLOSED COLLES' FRACTURE OF LEFT RADIUS, INITIAL ENCOUNTER: Primary | ICD-10-CM

## 2021-09-27 PROCEDURE — 99203 OFFICE O/P NEW LOW 30 MIN: CPT | Performed by: ORTHOPAEDIC SURGERY

## 2021-09-27 PROCEDURE — 25600 CLTX DST RDL FX/EPHYS SEP WO: CPT | Performed by: ORTHOPAEDIC SURGERY

## 2021-09-27 RX ORDER — TRAMADOL HYDROCHLORIDE 50 MG/1
50 TABLET ORAL EVERY 6 HOURS PRN
Qty: 20 TABLET | Refills: 0 | Status: SHIPPED | OUTPATIENT
Start: 2021-09-27

## 2021-09-27 NOTE — TELEPHONE ENCOUNTER
9/26/21   PROCEDURE:  XR WRIST COMPLETE (MIN 3 VIEWS), LEFT      Impression   CONCLUSION:     1. Distal radial fracture with mild impaction. 2. Minimally displaced ulnar styloid fracture.     3.  Osteoarthritic changes.     4.  Preliminary report was prov

## 2021-09-27 NOTE — H&P
Clinic Note [facility]     Assessment/Plan:  64year old female    1. Left extra-articular distal radius fracture–amenable to being treated closed. Patient will follow up in 1 week for repeat radiographs.   Patient will be placed in a short arm fibe 2013.   • Asthma    • GERD (gastroesophageal reflux disease) 3/12/2013   • Hearing loss 3/12/2013   • Herpes zoster without mention of complication 7/22/1589   • HLD (hyperlipidemia) 6/29/2012   • Hyperlipidemia    • Internal hemorrhoids without mention of Former Smoker        Quit date: 1985        Years since quittin.6      Smokeless tobacco: Never Used    Vaping Use      Vaping Use: Never used    Substance and Sexual Activity      Alcohol use: Yes        Comment: rare      Drug use: No      Sexu

## 2021-10-04 DIAGNOSIS — S52.532A CLOSED COLLES' FRACTURE OF LEFT RADIUS, INITIAL ENCOUNTER: Primary | ICD-10-CM

## 2021-10-05 ENCOUNTER — OFFICE VISIT (OUTPATIENT)
Dept: ORTHOPEDICS CLINIC | Facility: CLINIC | Age: 76
End: 2021-10-05
Payer: COMMERCIAL

## 2021-10-05 ENCOUNTER — HOSPITAL ENCOUNTER (OUTPATIENT)
Dept: GENERAL RADIOLOGY | Age: 76
Discharge: HOME | End: 2021-10-05
Attending: ORTHOPAEDIC SURGERY
Payer: MEDICARE

## 2021-10-05 VITALS — OXYGEN SATURATION: 100 % | HEART RATE: 83 BPM

## 2021-10-05 DIAGNOSIS — S52.532A CLOSED COLLES' FRACTURE OF LEFT RADIUS, INITIAL ENCOUNTER: ICD-10-CM

## 2021-10-05 DIAGNOSIS — S52.532A CLOSED COLLES' FRACTURE OF LEFT RADIUS, INITIAL ENCOUNTER: Primary | ICD-10-CM

## 2021-10-05 PROCEDURE — 99024 POSTOP FOLLOW-UP VISIT: CPT | Performed by: ORTHOPAEDIC SURGERY

## 2021-10-05 PROCEDURE — 73110 X-RAY EXAM OF WRIST: CPT | Performed by: ORTHOPAEDIC SURGERY

## 2021-10-05 NOTE — PROGRESS NOTES
Clinic Note [facility]     Assessment/Plan:  64year old female    1. Left extra-articular distal radius fracture–amenable to being treated closed. Continue cast for two more weeks.   2. Left carpal tunnel syndrome–we will monitor for resolution wit 3/12/2013   • Hearing loss 3/12/2013   • Herpes zoster without mention of complication 6/00/2000   • HLD (hyperlipidemia) 6/29/2012   • Hyperlipidemia    • Internal hemorrhoids without mention of complication 0/92/7048   • Left bundle branch block 6/29/201 History      Occupation:  SUPERVISOR        Employer: [employer]        Comment:     Tobacco Use      Smoking status: Former Smoker        Quit date: 1985        Years since quittin.6      Smokeless tobacco: Never U

## 2021-10-21 RX ORDER — LOSARTAN POTASSIUM 25 MG/1
25 TABLET ORAL DAILY
Qty: 90 TABLET | Refills: 0 | Status: SHIPPED | OUTPATIENT
Start: 2021-10-21 | End: 2021-12-22

## 2021-10-21 NOTE — TELEPHONE ENCOUNTER
Medication Quantity Refills Start End   Losartan Potassium 25 MG Oral Tab 30 tablet 1 9/1/2021    Sig:   Take 1 tablet (25 mg total) by mouth daily.        Hypertension Medications Protocol Passed 10/21/2021 12:17 PM   Protocol Details  CMP or BMP in past 1

## 2021-10-21 NOTE — TELEPHONE ENCOUNTER
Losartan Potassium 25 MG Oral Tab    [address] [facility], [address] [facility] [address] [facility], [address] [place], [phone]

## 2021-10-22 ENCOUNTER — HOSPITAL ENCOUNTER (OUTPATIENT)
Dept: GENERAL RADIOLOGY | Age: 76
Discharge: HOME | End: 2021-10-22
Attending: ORTHOPAEDIC SURGERY
Payer: MEDICARE

## 2021-10-22 ENCOUNTER — OFFICE VISIT (OUTPATIENT)
Dept: ORTHOPEDICS CLINIC | Facility: CLINIC | Age: 76
End: 2021-10-22
Payer: COMMERCIAL

## 2021-10-22 DIAGNOSIS — S52.532A CLOSED COLLES' FRACTURE OF LEFT RADIUS, INITIAL ENCOUNTER: ICD-10-CM

## 2021-10-22 DIAGNOSIS — S52.532A CLOSED COLLES' FRACTURE OF LEFT RADIUS, INITIAL ENCOUNTER: Primary | ICD-10-CM

## 2021-10-22 PROCEDURE — 73140 X-RAY EXAM OF FINGER(S): CPT | Performed by: ORTHOPAEDIC SURGERY

## 2021-10-22 PROCEDURE — 99024 POSTOP FOLLOW-UP VISIT: CPT | Performed by: ORTHOPAEDIC SURGERY

## 2021-10-23 NOTE — PROGRESS NOTES
Clinic Note [facility]     Assessment/Plan:  64year old female    1. Left extra-articular distal radius fracture–transition to removable wrist brace. Begin therapy. Can increase WB as tolerated. Discontinue brace at 6 weeks.   2. 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 Negative stress test 2013.    • Asthma    • GERD (gastroesophageal reflux disease) 3/12/2013   • Hearing loss 3/12/2013   • Herpes zoster without mention of complication 5/33/6793   • HLD (hyper Father    • Breast Cancer Paternal Cousin Female 32   • Diabetes Son    • Cancer Brother         Prostate     Social History    Occupational History      Occupation:  SUPERVISOR        Employer: [employer]        Comment:

## 2021-10-28 ENCOUNTER — TELEPHONE (OUTPATIENT)
Dept: PHYSICAL THERAPY | Facility: HOSPITAL | Age: 76
End: 2021-10-28

## 2021-10-29 ENCOUNTER — OFFICE VISIT (OUTPATIENT)
Dept: OCCUPATIONAL MEDICINE | Age: 76
End: 2021-10-29
Attending: ORTHOPAEDIC SURGERY
Payer: MEDICARE

## 2021-10-29 DIAGNOSIS — S52.532A CLOSED COLLES' FRACTURE OF LEFT RADIUS, INITIAL ENCOUNTER: ICD-10-CM

## 2021-10-29 PROCEDURE — 97165 OT EVAL LOW COMPLEX 30 MIN: CPT

## 2021-10-29 PROCEDURE — 97110 THERAPEUTIC EXERCISES: CPT

## 2021-10-29 NOTE — PROGRESS NOTES
OCCUPATIONAL THERAPY UPPER EXTREMITY EVALUATION   Referring Physician: Dr. Davonna Schirmer  Diagnosis:  Closed Colles' fracture of left radius, initial encounter (S52.532A)      Date of Service: 10/29/2021     PATIENT SUMMARY   Lavern Brambila is a 64year old fem include increase movement and use of wrist.  Past medical history was reviewed with Catherine Gar.  Significant findings include nothing pertinent to this issue    ASSESSMENT  Elliekevin Gar presents to occupational therapy evaluation with primary c/o wrist pain and limit instructed in and issued a HEP for: AROM to wrist, forearm, digits, thumb.      Charges: OT Eval: Low Complexity, 1 TE      Total Timed Treatment: 45 min     Total Treatment Time: 45 min     Based on clinical rationale and outcome measures, this evaluation Date____________________    Certification From: 91/95/7869  To:1/27/2022

## 2021-11-01 ENCOUNTER — OFFICE VISIT (OUTPATIENT)
Dept: OCCUPATIONAL MEDICINE | Age: 76
End: 2021-11-01
Attending: ORTHOPAEDIC SURGERY
Payer: MEDICARE

## 2021-11-01 PROCEDURE — 97140 MANUAL THERAPY 1/> REGIONS: CPT

## 2021-11-01 PROCEDURE — 97110 THERAPEUTIC EXERCISES: CPT

## 2021-11-01 PROCEDURE — 97022 WHIRLPOOL THERAPY: CPT

## 2021-11-01 NOTE — PROGRESS NOTES
Dx: Closed Colles' fracture of left radius, initial encounter (S50.80A)            Insurance (Authorized # of Visits):  [facility] Medicare           Authorizing Physician: Dr. Jean-Pierre Le  Next MD visit: none scheduled  Fall Risk: standard         Precautions: n/a

## 2021-11-04 ENCOUNTER — OFFICE VISIT (OUTPATIENT)
Dept: OCCUPATIONAL MEDICINE | Age: 76
End: 2021-11-04
Attending: ORTHOPAEDIC SURGERY
Payer: MEDICARE

## 2021-11-04 PROCEDURE — 97140 MANUAL THERAPY 1/> REGIONS: CPT

## 2021-11-04 PROCEDURE — 97110 THERAPEUTIC EXERCISES: CPT

## 2021-11-04 PROCEDURE — 97022 WHIRLPOOL THERAPY: CPT

## 2021-11-04 RX ORDER — LOSARTAN POTASSIUM 25 MG/1
TABLET ORAL
Qty: 30 TABLET | Refills: 0 | OUTPATIENT
Start: 2021-11-04

## 2021-11-04 NOTE — PROGRESS NOTES
Dx: Closed Colles' fracture of left radius, initial encounter (S50.80A)            Insurance (Authorized # of Visits):  [facility] Medicare           Authorizing Physician: Dr. Olga Lidia Carias  Next MD visit: none scheduled  Fall Risk: standard         Precautions: n/a

## 2021-11-04 NOTE — TELEPHONE ENCOUNTER
Requested Prescriptions     Name from pharmacy: LOSARTAN 25MG TABLETS         Will file in chart as: LOSARTAN 25 MG Oral Tab    Sig: TAKE 1 TABLET(25 MG) BY MOUTH DAILY    Disp:  30 tablet    Refills:  0 (Pharmacy requested: Not specified)    Start: 11/4/2

## 2021-11-06 ENCOUNTER — HOSPITAL ENCOUNTER (OUTPATIENT)
Dept: BONE DENSITY | Age: 76
Discharge: HOME | End: 2021-11-06
Attending: FAMILY MEDICINE
Payer: MEDICARE

## 2021-11-06 ENCOUNTER — HOSPITAL ENCOUNTER (OUTPATIENT)
Dept: MAMMOGRAPHY | Age: 76
Discharge: HOME | End: 2021-11-06
Attending: FAMILY MEDICINE
Payer: MEDICARE

## 2021-11-06 DIAGNOSIS — Z12.31 BREAST CANCER SCREENING BY MAMMOGRAM: ICD-10-CM

## 2021-11-06 DIAGNOSIS — Z78.0 POSTMENOPAUSAL: ICD-10-CM

## 2021-11-06 PROCEDURE — 77080 DXA BONE DENSITY AXIAL: CPT | Performed by: FAMILY MEDICINE

## 2021-11-08 ENCOUNTER — OFFICE VISIT (OUTPATIENT)
Dept: OCCUPATIONAL MEDICINE | Age: 76
End: 2021-11-08
Attending: ORTHOPAEDIC SURGERY
Payer: MEDICARE

## 2021-11-08 PROCEDURE — 97140 MANUAL THERAPY 1/> REGIONS: CPT

## 2021-11-08 PROCEDURE — 97022 WHIRLPOOL THERAPY: CPT

## 2021-11-08 PROCEDURE — 97110 THERAPEUTIC EXERCISES: CPT

## 2021-11-08 NOTE — PROGRESS NOTES
Dx: Closed Colles' fracture of left radius, initial encounter (S50.80A)            Insurance (Authorized # of Visits):  [facility] Medicare           Authorizing Physician: Dr. Davonna Schirmer  Next MD visit: none scheduled  Fall Risk: standard         Precautions: n/a upgrades in bold    Charges: 1 WP, 1 MT, 1 TE       Total Timed Treatment: 45 min  Total Treatment Time: 45 min

## 2021-11-11 ENCOUNTER — OFFICE VISIT (OUTPATIENT)
Dept: OCCUPATIONAL MEDICINE | Age: 76
End: 2021-11-11
Attending: ORTHOPAEDIC SURGERY
Payer: MEDICARE

## 2021-11-11 PROCEDURE — 97110 THERAPEUTIC EXERCISES: CPT

## 2021-11-11 PROCEDURE — 97140 MANUAL THERAPY 1/> REGIONS: CPT

## 2021-11-11 PROCEDURE — 97022 WHIRLPOOL THERAPY: CPT

## 2021-11-11 NOTE — PROGRESS NOTES
Dx: Closed Colles' fracture of left radius, initial encounter (S50.80A)            Insurance (Authorized # of Visits):  [facility] Medicare           Authorizing Physician: Dr. Rebecca Lundborg  Next MD visit: none scheduled  Fall Risk: standard         Precautions: n/a exercises  -/repostion  -roll/tip pinch  -tripod pinch  -lateral pinch PREs 1 lb  -wrist flexion  -wrist extension  -RD/UD  Each x 20    Pronation/supination with 7 oz hammer x 20    Pronation/supination with visual cue for max effort  Place/hold in pr

## 2021-11-15 ENCOUNTER — OFFICE VISIT (OUTPATIENT)
Dept: OCCUPATIONAL MEDICINE | Age: 76
End: 2021-11-15
Attending: ORTHOPAEDIC SURGERY
Payer: MEDICARE

## 2021-11-15 PROCEDURE — 97022 WHIRLPOOL THERAPY: CPT

## 2021-11-15 PROCEDURE — 97140 MANUAL THERAPY 1/> REGIONS: CPT

## 2021-11-15 PROCEDURE — 97110 THERAPEUTIC EXERCISES: CPT

## 2021-11-15 NOTE — PROGRESS NOTES
Dx: Closed Colles' fracture of left radius, initial encounter (S50.80A)            Insurance (Authorized # of Visits):  [facility] Medicare           Authorizing Physician: Dr. Memo Boateng  Next MD visit: none scheduled  Fall Risk: standard         Precautions: n/a mobilization   Wrist alphabet Combined digit flexion with wrist flexion,extension, RD/UD, pronation/supination Yellow putty exercises  -/repostion  -roll/tip pinch  -tripod pinch  -lateral pinch PREs 1 lb  -wrist flexion  -wrist extension  -RD/UD  Each

## 2021-11-17 ENCOUNTER — TELEPHONE (OUTPATIENT)
Dept: PHYSICAL THERAPY | Facility: HOSPITAL | Age: 76
End: 2021-11-17

## 2021-11-18 ENCOUNTER — APPOINTMENT (OUTPATIENT)
Dept: OCCUPATIONAL MEDICINE | Age: 76
End: 2021-11-18
Attending: ORTHOPAEDIC SURGERY
Payer: MEDICARE

## 2021-11-19 ENCOUNTER — OFFICE VISIT (OUTPATIENT)
Dept: OCCUPATIONAL MEDICINE | Age: 76
End: 2021-11-19
Attending: ORTHOPAEDIC SURGERY
Payer: MEDICARE

## 2021-11-19 PROCEDURE — 97110 THERAPEUTIC EXERCISES: CPT

## 2021-11-19 PROCEDURE — 97022 WHIRLPOOL THERAPY: CPT

## 2021-11-19 PROCEDURE — 97140 MANUAL THERAPY 1/> REGIONS: CPT

## 2021-11-19 NOTE — PROGRESS NOTES
Dx: Closed Colles' fracture of left radius, initial encounter (S50.80A)            Insurance (Authorized # of Visits):  [facility] Medicare           Authorizing Physician: Dr. Ceci Marin  Next MD visit: none scheduled  Fall Risk: standard         Precautions: n/a progressive A/AA/PROM to wrist and forearm Gentle progressive A/AA/PROM to wrist and forearm Gentle progressive A/AA/PROM to wrist and forearm  Joint mobilization Gentle progressive A/AA/PROM to wrist and forearm  Joint mobilization   Wrist alphabet Combin

## 2021-11-22 ENCOUNTER — OFFICE VISIT (OUTPATIENT)
Dept: OCCUPATIONAL MEDICINE | Age: 76
End: 2021-11-22
Attending: ORTHOPAEDIC SURGERY
Payer: MEDICARE

## 2021-11-22 PROCEDURE — 97022 WHIRLPOOL THERAPY: CPT

## 2021-11-22 PROCEDURE — 97110 THERAPEUTIC EXERCISES: CPT

## 2021-11-22 PROCEDURE — 97140 MANUAL THERAPY 1/> REGIONS: CPT

## 2021-11-22 NOTE — PROGRESS NOTES
Dx: Closed Colles' fracture of left radius, initial encounter (S50.80A)            Insurance (Authorized # of Visits):  [facility] Medicare           Authorizing Physician: Dr. Kayode Falcon  Next MD visit: none scheduled  Fall Risk: standard         Precautions: n/a [phone]. Sincerely,  Electronically signed by therapist: CYNTHIA Huang/EUGENIO    Physician's certification required:  No  Please co-sign or sign and return this letter via fax as soon as possible to [phone].    I certify the need for these Treatment Time: 45 min

## 2021-11-23 ENCOUNTER — HOSPITAL ENCOUNTER (OUTPATIENT)
Dept: GENERAL RADIOLOGY | Age: 76
Discharge: HOME | End: 2021-11-23
Attending: ORTHOPAEDIC SURGERY
Payer: MEDICARE

## 2021-11-23 ENCOUNTER — OFFICE VISIT (OUTPATIENT)
Dept: ORTHOPEDICS CLINIC | Facility: CLINIC | Age: 76
End: 2021-11-23
Payer: COMMERCIAL

## 2021-11-23 VITALS — HEART RATE: 70 BPM | OXYGEN SATURATION: 98 %

## 2021-11-23 DIAGNOSIS — S52.532A CLOSED COLLES' FRACTURE OF LEFT RADIUS, INITIAL ENCOUNTER: ICD-10-CM

## 2021-11-23 DIAGNOSIS — S52.532A CLOSED COLLES' FRACTURE OF LEFT RADIUS, INITIAL ENCOUNTER: Primary | ICD-10-CM

## 2021-11-23 PROCEDURE — 99024 POSTOP FOLLOW-UP VISIT: CPT | Performed by: ORTHOPAEDIC SURGERY

## 2021-11-23 PROCEDURE — 73110 X-RAY EXAM OF WRIST: CPT | Performed by: ORTHOPAEDIC SURGERY

## 2021-11-23 NOTE — PROGRESS NOTES
Clinic Note [facility]     Assessment/Plan:  64year old female    1. Left extra-articular distal radius fracture–discontinue wrist brace and no weightbearing restrictions at this point.   2. Left carpal tunnel syndrome–appears to be largely resolved significantly improved and she is functioning more closely to her baseline. Past Medical History:   Diagnosis Date   • Aortic valve sclerosis 6/29/2012    Seen on Echo 2012, asymptomatic. Negative stress test 2013.    • Asthma    • GERD (gastroesophagea Coughing  Family History   Problem Relation Age of Onset   • Cancer Father         Colon   • Other (Other) Father    • Breast Cancer Paternal Cousin Female 32   • Diabetes Son    • Cancer Brother         Prostate     Social History    Occupational History

## 2021-11-29 ENCOUNTER — OFFICE VISIT (OUTPATIENT)
Dept: OCCUPATIONAL MEDICINE | Age: 76
End: 2021-11-29
Attending: ORTHOPAEDIC SURGERY
Payer: MEDICARE

## 2021-11-29 PROCEDURE — 97110 THERAPEUTIC EXERCISES: CPT

## 2021-11-29 PROCEDURE — 97140 MANUAL THERAPY 1/> REGIONS: CPT

## 2021-11-29 PROCEDURE — 97022 WHIRLPOOL THERAPY: CPT

## 2021-11-29 NOTE — PROGRESS NOTES
Dx: Closed Colles' fracture of left radius, initial encounter (S50.80A)            Insurance (Authorized # of Visits):  [facility] Medicare           Authorizing Physician: Dr. Delgado ref.  provider found  Next MD visit: none scheduled  Fall Risk: standard         Pr certification required:  No  Please co-sign or sign and return this letter via fax as soon as possible to [phone]. I certify the need for these services furnished under this plan of treatment and while under my care.     X____________________________

## 2021-12-02 ENCOUNTER — APPOINTMENT (OUTPATIENT)
Dept: OCCUPATIONAL MEDICINE | Age: 76
End: 2021-12-02
Payer: MEDICARE

## 2021-12-03 ENCOUNTER — OFFICE VISIT (OUTPATIENT)
Dept: OCCUPATIONAL MEDICINE | Age: 76
End: 2021-12-03
Attending: FAMILY MEDICINE
Payer: MEDICARE

## 2021-12-03 PROCEDURE — 97022 WHIRLPOOL THERAPY: CPT

## 2021-12-03 PROCEDURE — 97110 THERAPEUTIC EXERCISES: CPT

## 2021-12-03 PROCEDURE — 97140 MANUAL THERAPY 1/> REGIONS: CPT

## 2021-12-03 NOTE — PROGRESS NOTES
Dx: Closed Colles' fracture of left radius, initial encounter (S50.80A)            Insurance (Authorized # of Visits):  [facility] Medicare           Authorizing Physician: Dr. Delgado ref.  provider found  Next MD visit: none scheduled  Fall Risk: standard         Pr [phone]. Sincerely,  Electronically signed by therapist: CYNTHIA Mejia/L    Physician's certification required:  No  Please co-sign or sign and return this letter via fax as soon as possible to [phone].    I certify the need for these

## 2021-12-06 ENCOUNTER — OFFICE VISIT (OUTPATIENT)
Dept: OCCUPATIONAL MEDICINE | Age: 76
End: 2021-12-06
Attending: FAMILY MEDICINE
Payer: MEDICARE

## 2021-12-06 PROCEDURE — 97022 WHIRLPOOL THERAPY: CPT

## 2021-12-06 PROCEDURE — 97140 MANUAL THERAPY 1/> REGIONS: CPT

## 2021-12-06 PROCEDURE — 97110 THERAPEUTIC EXERCISES: CPT

## 2021-12-06 NOTE — PROGRESS NOTES
Dx: Closed Colles' fracture of left radius, initial encounter (S50.80A)            Insurance (Authorized # of Visits):  [facility] Medicare           Authorizing Physician: Dr. Delgado ref.  provider found  Next MD visit: none scheduled  Fall Risk: standard         Pr I certify the need for these services furnished under this plan of treatment and while under my care.     X___________________________________________________ Date____________________    Certification From: 96/16/5514  To:2/20/2022     Date: 11/15/21  Tx# upgrades in bold    Charges: 1 WP, 1 MT, 1 TE       Total Timed Treatment: 45 min  Total Treatment Time: 45 min

## 2021-12-08 ENCOUNTER — HOSPITAL ENCOUNTER (OUTPATIENT)
Dept: MAMMOGRAPHY | Age: 76
Discharge: HOME | End: 2021-12-08
Attending: FAMILY MEDICINE
Payer: MEDICARE

## 2021-12-08 PROCEDURE — 77067 SCR MAMMO BI INCL CAD: CPT | Performed by: FAMILY MEDICINE

## 2021-12-08 PROCEDURE — 77063 BREAST TOMOSYNTHESIS BI: CPT | Performed by: FAMILY MEDICINE

## 2021-12-09 ENCOUNTER — APPOINTMENT (OUTPATIENT)
Dept: OCCUPATIONAL MEDICINE | Age: 76
End: 2021-12-09
Payer: MEDICARE

## 2021-12-10 ENCOUNTER — OFFICE VISIT (OUTPATIENT)
Dept: OCCUPATIONAL MEDICINE | Age: 76
End: 2021-12-10
Attending: FAMILY MEDICINE
Payer: MEDICARE

## 2021-12-10 PROCEDURE — 97110 THERAPEUTIC EXERCISES: CPT

## 2021-12-10 PROCEDURE — 97140 MANUAL THERAPY 1/> REGIONS: CPT

## 2021-12-10 PROCEDURE — 97022 WHIRLPOOL THERAPY: CPT

## 2021-12-10 NOTE — PROGRESS NOTES
Dx: Closed Colles' fracture of left radius, initial encounter (S50.80A)            Insurance (Authorized # of Visits):  [facility] Medicare           Authorizing Physician: Dr. Delgado ref.  provider found  Next MD visit: none scheduled  Fall Risk: standard         Pr by therapist: Carter De La Fuente OTR/EUGENIO    Physician's certification required:  No  Please co-sign or sign and return this letter via fax as soon as possible to [phone].    I certify the need for these services furnished under this plan of treatment an Total Timed Treatment: 45 min  Total Treatment Time: 45 min

## 2021-12-13 ENCOUNTER — OFFICE VISIT (OUTPATIENT)
Dept: OCCUPATIONAL MEDICINE | Age: 76
End: 2021-12-13
Attending: ORTHOPAEDIC SURGERY
Payer: MEDICARE

## 2021-12-13 PROCEDURE — 97140 MANUAL THERAPY 1/> REGIONS: CPT

## 2021-12-13 PROCEDURE — 97022 WHIRLPOOL THERAPY: CPT

## 2021-12-13 PROCEDURE — 97110 THERAPEUTIC EXERCISES: CPT

## 2021-12-13 NOTE — PROGRESS NOTES
Dx: Closed Colles' fracture of left radius, initial encounter (S50.80A)            Insurance (Authorized # of Visits):  [facility] Medicare           Authorizing Physician: Dr. Delgado ref.  provider found  Next MD visit: none scheduled  Fall Risk: standard         Pr contact me at Dept: [phone]. Sincerely,  Electronically signed by therapist: Kole Munson OTR/EUGENIO    Physician's certification required:  No  Please co-sign or sign and return this letter via fax as soon as possible to [phone].    I certif digit x 10 Digiflex green full  x 20 Digiflex blue full  x 20  Green each digit x 10 beige Flex bar  -wrist flexion  -wrist extension  -bar bend  -reverse bar bend    Each x 20 Digiflex black full  x 20  Blue each digit x 15  Blue tripod pinch

## 2021-12-16 ENCOUNTER — OFFICE VISIT (OUTPATIENT)
Dept: OCCUPATIONAL MEDICINE | Age: 76
End: 2021-12-16
Attending: FAMILY MEDICINE
Payer: MEDICARE

## 2021-12-16 PROCEDURE — 97140 MANUAL THERAPY 1/> REGIONS: CPT

## 2021-12-16 PROCEDURE — 97110 THERAPEUTIC EXERCISES: CPT

## 2021-12-16 PROCEDURE — 97022 WHIRLPOOL THERAPY: CPT

## 2021-12-16 NOTE — PROGRESS NOTES
Dx: Closed Colles' fracture of left radius, initial encounter (S50.80A)            Insurance (Authorized # of Visits):  [facility] Medicare           Authorizing Physician: Dr. Delgado ref.  provider found  Next MD visit: none scheduled  Fall Risk: standard         Pr your referral. If you have any questions, please contact me at Dept: [phone].     Sincerely,  Electronically signed by therapist: CYNTHIA Chavira/EUGENIO    Physician's certification required:  No  Please co-sign or sign and return this letter via fa full  x 20  Blue each digit x 15  Blue tripod pinch x 20  Blue lateral pinch x 20 Prayer stretch    Prayer stretch with movement/push     Gripper black level 2 x 20  Level 1 tripod pinch x 20   Blue power web  Full x  x 20    HEP: HEP upgrades in b

## 2021-12-17 ENCOUNTER — APPOINTMENT (OUTPATIENT)
Dept: OCCUPATIONAL MEDICINE | Age: 76
End: 2021-12-17
Payer: MEDICARE

## 2021-12-21 ENCOUNTER — OFFICE VISIT (OUTPATIENT)
Dept: FAMILY MEDICINE CLINIC | Facility: CLINIC | Age: 76
End: 2021-12-21
Payer: COMMERCIAL

## 2021-12-21 ENCOUNTER — HOSPITAL ENCOUNTER (OUTPATIENT)
Dept: GENERAL RADIOLOGY | Age: 76
Discharge: HOME | End: 2021-12-21
Attending: FAMILY MEDICINE
Payer: MEDICARE

## 2021-12-21 ENCOUNTER — OFFICE VISIT (OUTPATIENT)
Dept: OCCUPATIONAL MEDICINE | Age: 76
End: 2021-12-21
Attending: EMERGENCY MEDICINE
Payer: MEDICARE

## 2021-12-21 VITALS
HEART RATE: 84 BPM | TEMPERATURE: 97 F | OXYGEN SATURATION: 99 % | SYSTOLIC BLOOD PRESSURE: 138 MMHG | RESPIRATION RATE: 16 BRPM | DIASTOLIC BLOOD PRESSURE: 80 MMHG

## 2021-12-21 DIAGNOSIS — M81.0 AGE-RELATED OSTEOPOROSIS WITHOUT CURRENT PATHOLOGICAL FRACTURE: ICD-10-CM

## 2021-12-21 DIAGNOSIS — M25.532 LEFT WRIST PAIN: Primary | ICD-10-CM

## 2021-12-21 DIAGNOSIS — M25.532 LEFT WRIST PAIN: ICD-10-CM

## 2021-12-21 PROCEDURE — 99214 OFFICE O/P EST MOD 30 MIN: CPT | Performed by: FAMILY MEDICINE

## 2021-12-21 PROCEDURE — 73110 X-RAY EXAM OF WRIST: CPT | Performed by: FAMILY MEDICINE

## 2021-12-21 PROCEDURE — 97140 MANUAL THERAPY 1/> REGIONS: CPT

## 2021-12-21 PROCEDURE — 3079F DIAST BP 80-89 MM HG: CPT | Performed by: FAMILY MEDICINE

## 2021-12-21 PROCEDURE — 97022 WHIRLPOOL THERAPY: CPT

## 2021-12-21 PROCEDURE — 97110 THERAPEUTIC EXERCISES: CPT

## 2021-12-21 PROCEDURE — 3075F SYST BP GE 130 - 139MM HG: CPT | Performed by: FAMILY MEDICINE

## 2021-12-21 NOTE — PROGRESS NOTES
Dx: Closed Colles' fracture of left radius, initial encounter (S50.80A)            Insurance (Authorized # of Visits):  [facility] Medicare           Authorizing Physician: Dr. Delgado ref.  provider found  Next MD visit: none scheduled  Fall Risk: standard         Pr agreed to actively participate in planning and for this course of care. Thank you for your referral. If you have any questions, please contact me at Dept: [phone].     Sincerely,  Electronically signed by therapist: CYNTHIA Ruiz/EUGENIO    Phys forearm all planes   Digiflex green full  x 20 Digiflex blue full  x 20  Green each digit x 10 beige Flex bar  -wrist flexion  -wrist extension  -bar bend  -reverse bar bend    Each x 20 Digiflex black full  x 20  Blue each digit x 15  Blue tri

## 2021-12-21 NOTE — PROGRESS NOTES
HPI:   Luisa Bryant is a 64year old female.     Patient presents with:  Test Results: discuss dexa scan   Wrist Pain: left wrist - states she fell - PT suggested an x-ray of wrist for Dr. Kayode Falcon to look at since she had it brocken before    Tanja Santana again 4-5 1  •  Calcium Carbonate-Vitamin D (CALCIUM-D) 600-400 MG-UNIT Oral Tab, Take  by mouth daily. , Disp: , Rfl:   •  Multiple Vitamins-Minerals (MULTIVITAMIN OR), Take 1 tablet by mouth daily. , Disp: , Rfl:   •  Aspirin (ASPIR-81 OR), Take 1 tablet by mouth da discussed in detail. Questions and concerns addressed. Red flags to RTC or ED reviewed. Patient (or parent) agrees to plan. Return if symptoms worsen or fail to improve.     Sina Robbins DO  Family Medicine   12/21/2021  1:04 PM

## 2021-12-22 RX ORDER — LOSARTAN POTASSIUM 25 MG/1
TABLET ORAL
Qty: 90 TABLET | Refills: 1 | Status: SHIPPED | OUTPATIENT
Start: 2021-12-22

## 2021-12-22 NOTE — TELEPHONE ENCOUNTER
Hypertension Medications Protocol Passed 12/22/2021 04:46 AM   Protocol Details  CMP or BMP in past 12 months    Last serum creatinine< 2.0    Appointment in past 6 or next 3 months        Refill protocol passed because the patient met the following jes

## 2021-12-23 ENCOUNTER — APPOINTMENT (OUTPATIENT)
Dept: OCCUPATIONAL MEDICINE | Age: 76
End: 2021-12-23
Payer: MEDICARE

## 2021-12-27 ENCOUNTER — OFFICE VISIT (OUTPATIENT)
Dept: OCCUPATIONAL MEDICINE | Age: 76
End: 2021-12-27
Attending: ORTHOPAEDIC SURGERY
Payer: MEDICARE

## 2021-12-27 PROCEDURE — 97110 THERAPEUTIC EXERCISES: CPT

## 2021-12-27 PROCEDURE — 97022 WHIRLPOOL THERAPY: CPT

## 2021-12-27 PROCEDURE — 97140 MANUAL THERAPY 1/> REGIONS: CPT

## 2021-12-27 NOTE — PROGRESS NOTES
Dx: Closed Colles' fracture of left radius, initial encounter (S50.80A)            Insurance (Authorized # of Visits):  [facility] Medicare           Authorizing Physician: Dr. Ann Rodriguez MD visit: none scheduled  Fall Risk: standard         Precautions: n/a Destiny Galvan OTR/L    Physician's certification required:  No  Please co-sign or sign and return this letter via fax as soon as possible to [phone].    I certify the need for these services furnished under this plan of treatment and while under bold    Charges: 1 WP, 1 MT, 1 TE       Total Timed Treatment: 45 min  Total Treatment Time: 45 min

## 2021-12-30 ENCOUNTER — TELEPHONE (OUTPATIENT)
Dept: FAMILY MEDICINE CLINIC | Facility: CLINIC | Age: 76
End: 2021-12-30

## 2021-12-30 ENCOUNTER — OFFICE VISIT (OUTPATIENT)
Dept: OCCUPATIONAL MEDICINE | Age: 76
End: 2021-12-30
Attending: FAMILY MEDICINE
Payer: MEDICARE

## 2021-12-30 ENCOUNTER — OFFICE VISIT (OUTPATIENT)
Dept: ORTHOPEDICS CLINIC | Facility: CLINIC | Age: 76
End: 2021-12-30
Payer: COMMERCIAL

## 2021-12-30 VITALS — HEIGHT: 60 IN | BODY MASS INDEX: 18.65 KG/M2 | WEIGHT: 95 LBS

## 2021-12-30 DIAGNOSIS — S52.532A CLOSED COLLES' FRACTURE OF LEFT RADIUS, INITIAL ENCOUNTER: Primary | ICD-10-CM

## 2021-12-30 PROCEDURE — 97022 WHIRLPOOL THERAPY: CPT

## 2021-12-30 PROCEDURE — 97110 THERAPEUTIC EXERCISES: CPT

## 2021-12-30 PROCEDURE — 97140 MANUAL THERAPY 1/> REGIONS: CPT

## 2021-12-30 PROCEDURE — 99024 POSTOP FOLLOW-UP VISIT: CPT | Performed by: ORTHOPAEDIC SURGERY

## 2021-12-30 PROCEDURE — 3008F BODY MASS INDEX DOCD: CPT | Performed by: ORTHOPAEDIC SURGERY

## 2021-12-30 NOTE — PROGRESS NOTES
Clinic Note [facility]     Assessment/Plan:  64year old female    1. Left extra-articular distal radius fracture–functionally recovered, patient can follow-up on as-needed basis. 2. Left carpal tunnel syndrome–resolved  3.  Left wrist tendinitis–la (gastroesophageal reflux disease) 3/12/2013   • Hearing loss 3/12/2013   • Herpes zoster without mention of complication 7/68/3294   • HLD (hyperlipidemia) 6/29/2012   • Hyperlipidemia    • Internal hemorrhoids without mention of complication 2/22/8625   • Occupational History      Occupation:  SUPERVISOR        Employer: [employer]        Comment:     Tobacco Use      Smoking status: Former Smoker        Quit date: 1985        Years since quittin.9      Smokeless tob

## 2021-12-30 NOTE — TELEPHONE ENCOUNTER
Pt is calling to ask if the nurse was able to check to see if the injection for strengthening her bones would be covered by her insurance? She states she was just in to see  on the 21st, and the nurse told her she would look into it.  She didn't hear anyt

## 2021-12-30 NOTE — TELEPHONE ENCOUNTER
Spoke with pt and informed that [employer] verification is still in process and we will contact pt as soon as we received the verification. Pt verbalized understanding.

## 2021-12-30 NOTE — PROGRESS NOTES
Dx: Closed Colles' fracture of left radius, initial encounter (S50.80A)            Insurance (Authorized # of Visits):  [facility] Medicare           Authorizing Physician: Dr. Delgado ref.  provider found  Next MD visit: none scheduled  Fall Risk: standard         Pr at Dept: [phone]. Sincerely,  Electronically signed by therapist: CYNTHIA Ac/EUGENIO    Physician's certification required:  No  Please co-sign or sign and return this letter via fax as soon as possible to [phone].    I certify the need digit x 15  Blue tripod pinch x 20  Blue lateral pinch x 20 Prayer stretch    Prayer stretch with movement/push Wrist circumduction Low load end range stretch  To wrist  Review HEP   Gripper black level 2 x 20  Level 1 tripod pinch x 20   Blue power web  F

## 2022-01-03 ENCOUNTER — APPOINTMENT (OUTPATIENT)
Dept: OCCUPATIONAL MEDICINE | Age: 77
End: 2022-01-03
Attending: FAMILY MEDICINE
Payer: MEDICARE

## 2022-01-07 NOTE — TELEPHONE ENCOUNTER
Notified pt that insurance verification has been completed for Prolia. Pt will get labs done at [address] [place] [address] and once results are received she will schedule appointment to receive Prolia.

## 2022-01-12 LAB
ALBUMIN/GLOBULIN RATIO: 1.8 (CALC) (ref 1–2.5)
ALBUMIN: 4.6 G/DL (ref 3.6–5.1)
ALKALINE PHOSPHATASE: 95 U/L (ref 37–153)
ALT: 12 U/L (ref 6–29)
AST: 19 U/L (ref 10–35)
BILIRUBIN, TOTAL: 0.7 MG/DL (ref 0.2–1.2)
BUN: 20 MG/DL (ref 7–25)
CALCIUM: 9.6 MG/DL (ref 8.6–10.4)
CARBON DIOXIDE: 31 MMOL/L (ref 20–32)
CHLORIDE: 103 MMOL/L (ref 98–110)
CREATININE: 0.66 MG/DL (ref 0.6–0.93)
EGFR IF AFRICN AM: 98 ML/MIN/1.73M2
EGFR IF NONAFRICN AM: 85 ML/MIN/1.73M2
GLOBULIN: 2.6 G/DL (CALC) (ref 1.9–3.7)
GLUCOSE: 100 MG/DL (ref 65–99)
MAGNESIUM: 2.3 MG/DL (ref 1.5–2.5)
PHOSPHATE (AS PHOSPHORUS): 4.6 MG/DL (ref 2.1–4.3)
POTASSIUM: 4.2 MMOL/L (ref 3.5–5.3)
PROTEIN, TOTAL: 7.2 G/DL (ref 6.1–8.1)
SODIUM: 140 MMOL/L (ref 135–146)

## 2022-01-14 ENCOUNTER — NURSE ONLY (OUTPATIENT)
Dept: FAMILY MEDICINE CLINIC | Facility: CLINIC | Age: 77
End: 2022-01-14
Payer: COMMERCIAL

## 2022-01-14 DIAGNOSIS — M81.0 AGE-RELATED OSTEOPOROSIS WITHOUT CURRENT PATHOLOGICAL FRACTURE: Primary | ICD-10-CM

## 2022-01-14 PROCEDURE — 96372 THER/PROPH/DIAG INJ SC/IM: CPT | Performed by: FAMILY MEDICINE

## 2022-02-01 RX ORDER — ATORVASTATIN CALCIUM 40 MG/1
TABLET, FILM COATED ORAL
Qty: 90 TABLET | Refills: 0 | Status: SHIPPED | OUTPATIENT
Start: 2022-02-01

## 2022-03-09 ENCOUNTER — OFFICE VISIT (OUTPATIENT)
Dept: FAMILY MEDICINE CLINIC | Facility: CLINIC | Age: 77
End: 2022-03-09
Payer: COMMERCIAL

## 2022-03-09 VITALS
DIASTOLIC BLOOD PRESSURE: 58 MMHG | SYSTOLIC BLOOD PRESSURE: 130 MMHG | TEMPERATURE: 98 F | HEART RATE: 73 BPM | RESPIRATION RATE: 16 BRPM | OXYGEN SATURATION: 99 %

## 2022-03-09 DIAGNOSIS — H92.01 EAR PAIN, RIGHT: Primary | ICD-10-CM

## 2022-03-09 PROCEDURE — 99213 OFFICE O/P EST LOW 20 MIN: CPT | Performed by: NURSE PRACTITIONER

## 2022-03-09 PROCEDURE — 3075F SYST BP GE 130 - 139MM HG: CPT | Performed by: NURSE PRACTITIONER

## 2022-03-09 PROCEDURE — 3078F DIAST BP <80 MM HG: CPT | Performed by: NURSE PRACTITIONER

## 2022-06-07 ENCOUNTER — TELEPHONE (OUTPATIENT)
Dept: ORTHOPEDICS CLINIC | Facility: CLINIC | Age: 77
End: 2022-06-07

## 2022-06-20 DIAGNOSIS — E78.2 MIXED HYPERLIPIDEMIA: ICD-10-CM

## 2022-06-21 ENCOUNTER — TELEPHONE (OUTPATIENT)
Dept: FAMILY MEDICINE CLINIC | Facility: CLINIC | Age: 77
End: 2022-06-21

## 2022-06-21 DIAGNOSIS — I10 ESSENTIAL HYPERTENSION: ICD-10-CM

## 2022-06-21 DIAGNOSIS — R73.03 PRE-DIABETES: ICD-10-CM

## 2022-06-21 DIAGNOSIS — E78.2 MIXED HYPERLIPIDEMIA: ICD-10-CM

## 2022-06-21 DIAGNOSIS — Z00.00 LABORATORY EXAMINATION ORDERED AS PART OF A ROUTINE GENERAL MEDICAL EXAMINATION: Primary | ICD-10-CM

## 2022-06-21 RX ORDER — LOSARTAN POTASSIUM 25 MG/1
TABLET ORAL
Qty: 90 TABLET | Refills: 3 | Status: SHIPPED | OUTPATIENT
Start: 2022-06-21 | End: 2022-06-22

## 2022-06-21 RX ORDER — ATORVASTATIN CALCIUM 40 MG/1
TABLET, FILM COATED ORAL
Qty: 90 TABLET | Refills: 3 | Status: SHIPPED | OUTPATIENT
Start: 2022-06-21 | End: 2022-06-22

## 2022-06-21 NOTE — TELEPHONE ENCOUNTER
Pt is coming in on 9/2/22 for her supervisit and she would like her labs ordered so she can go 1 week prior to her appointment and have them done.

## 2022-06-22 DIAGNOSIS — E78.2 MIXED HYPERLIPIDEMIA: ICD-10-CM

## 2022-06-22 RX ORDER — ATORVASTATIN CALCIUM 40 MG/1
TABLET, FILM COATED ORAL
Qty: 90 TABLET | Refills: 3 | Status: SHIPPED | OUTPATIENT
Start: 2022-06-22

## 2022-06-22 RX ORDER — LOSARTAN POTASSIUM 25 MG/1
TABLET ORAL
Qty: 90 TABLET | Refills: 3 | Status: SHIPPED | OUTPATIENT
Start: 2022-06-22

## 2022-07-19 ENCOUNTER — TELEPHONE (OUTPATIENT)
Dept: FAMILY MEDICINE CLINIC | Facility: CLINIC | Age: 77
End: 2022-07-19

## 2022-07-19 DIAGNOSIS — M81.0 AGE-RELATED OSTEOPOROSIS WITHOUT CURRENT PATHOLOGICAL FRACTURE: Primary | ICD-10-CM

## 2022-07-19 NOTE — TELEPHONE ENCOUNTER
Pt called and said she is due for her Prolia shot and she said she needs to have labs done prior to the shot. She wants to know if the orders that are in the system will cover what she needs for the Prolia shot? She said she may be outside when the nurse calls but she can leave a detailed message.

## 2022-07-19 NOTE — TELEPHONE ENCOUNTER
Pt is due for Prolia. Last injection 1/14/22. Insurance verification initiated through "[employer]. Lab orders pended.

## 2022-07-22 NOTE — TELEPHONE ENCOUNTER
Future Appointments   Date Time Provider Sol Luna   7/26/2022  9:30 AM [facility] 20 NURSE [facility] [address]   8/26/2022 11:00 AM Rahel Wade DO [facility] [address]

## 2022-07-22 NOTE — TELEPHONE ENCOUNTER
LM informing pt she is all set to schedule her Prolia injection, insurance verification has been received and labs have been ordered. Advised pt she may get her labs done and schedule her appointment to receive her Prolia, office number provided to call and schedule appointment.

## 2022-07-23 LAB
ALBUMIN/GLOBULIN RATIO: 1.8 (CALC) (ref 1–2.5)
ALBUMIN: 4.3 G/DL (ref 3.6–5.1)
ALKALINE PHOSPHATASE: 63 U/L (ref 37–153)
ALT: 10 U/L (ref 6–29)
AST: 16 U/L (ref 10–35)
BILIRUBIN, TOTAL: 0.7 MG/DL (ref 0.2–1.2)
BUN: 21 MG/DL (ref 7–25)
CALCIUM: 9.2 MG/DL (ref 8.6–10.4)
CARBON DIOXIDE: 28 MMOL/L (ref 20–32)
CHLORIDE: 109 MMOL/L (ref 98–110)
CREATININE: 0.73 MG/DL (ref 0.6–1)
EGFR: 84 ML/MIN/1.73M2
GLOBULIN: 2.4 G/DL (CALC) (ref 1.9–3.7)
GLUCOSE: 100 MG/DL (ref 65–99)
MAGNESIUM: 2.3 MG/DL (ref 1.5–2.5)
PHOSPHATE (AS PHOSPHORUS): 3.7 MG/DL (ref 2.1–4.3)
POTASSIUM: 4.4 MMOL/L (ref 3.5–5.3)
PROTEIN, TOTAL: 6.7 G/DL (ref 6.1–8.1)
SODIUM: 146 MMOL/L (ref 135–146)

## 2022-07-26 ENCOUNTER — NURSE ONLY (OUTPATIENT)
Dept: FAMILY MEDICINE CLINIC | Facility: CLINIC | Age: 77
End: 2022-07-26
Payer: COMMERCIAL

## 2022-07-26 DIAGNOSIS — M81.0 AGE-RELATED OSTEOPOROSIS WITHOUT CURRENT PATHOLOGICAL FRACTURE: Primary | ICD-10-CM

## 2022-07-26 PROCEDURE — 96372 THER/PROPH/DIAG INJ SC/IM: CPT | Performed by: FAMILY MEDICINE

## 2022-08-24 LAB
ABSOLUTE BASOPHILS: 41 CELLS/UL (ref 0–200)
ABSOLUTE EOSINOPHILS: 101 CELLS/UL (ref 15–500)
ABSOLUTE LYMPHOCYTES: 980 CELLS/UL (ref 850–3900)
ABSOLUTE MONOCYTES: 414 CELLS/UL (ref 200–950)
ABSOLUTE NEUTROPHILS: 3064 CELLS/UL (ref 1500–7800)
ALBUMIN/GLOBULIN RATIO: 1.7 (CALC) (ref 1–2.5)
ALBUMIN: 4.5 G/DL (ref 3.6–5.1)
ALKALINE PHOSPHATASE: 60 U/L (ref 37–153)
ALT: 11 U/L (ref 6–29)
AST: 19 U/L (ref 10–35)
BASOPHILS: 0.9 %
BILIRUBIN, TOTAL: 0.7 MG/DL (ref 0.2–1.2)
BUN: 22 MG/DL (ref 7–25)
CALCIUM: 9.8 MG/DL (ref 8.6–10.4)
CARBON DIOXIDE: 31 MMOL/L (ref 20–32)
CHLORIDE: 106 MMOL/L (ref 98–110)
CHOL/HDLC RATIO: 2.2 (CALC)
CHOLESTEROL, TOTAL: 172 MG/DL
CREATININE: 0.71 MG/DL (ref 0.6–1)
EGFR: 86 ML/MIN/1.73M2
EOSINOPHILS: 2.2 %
GLOBULIN: 2.6 G/DL (CALC) (ref 1.9–3.7)
GLUCOSE: 100 MG/DL (ref 65–99)
HDL CHOLESTEROL: 78 MG/DL
HEMATOCRIT: 38.2 % (ref 35–45)
HEMOGLOBIN: 12.6 G/DL (ref 11.7–15.5)
LDL-CHOLESTEROL: 77 MG/DL (CALC)
LYMPHOCYTES: 21.3 %
MCH: 28.9 PG (ref 27–33)
MCHC: 33 G/DL (ref 32–36)
MCV: 87.6 FL (ref 80–100)
MONOCYTES: 9 %
MPV: 12 FL (ref 7.5–12.5)
NEUTROPHILS: 66.6 %
NON-HDL CHOLESTEROL: 94 MG/DL (CALC)
PLATELET COUNT: 251 THOUSAND/UL (ref 140–400)
POTASSIUM: 4.3 MMOL/L (ref 3.5–5.3)
PROTEIN, TOTAL: 7.1 G/DL (ref 6.1–8.1)
RDW: 13.5 % (ref 11–15)
RED BLOOD CELL COUNT: 4.36 MILLION/UL (ref 3.8–5.1)
SODIUM: 143 MMOL/L (ref 135–146)
TRIGLYCERIDES: 83 MG/DL
TSH W/REFLEX TO FT4: 2.75 MIU/L (ref 0.4–4.5)
WHITE BLOOD CELL COUNT: 4.6 THOUSAND/UL (ref 3.8–10.8)

## 2022-08-26 ENCOUNTER — OFFICE VISIT (OUTPATIENT)
Dept: FAMILY MEDICINE CLINIC | Facility: CLINIC | Age: 77
End: 2022-08-26
Payer: COMMERCIAL

## 2022-08-26 VITALS
TEMPERATURE: 97 F | SYSTOLIC BLOOD PRESSURE: 130 MMHG | RESPIRATION RATE: 14 BRPM | BODY MASS INDEX: 19.04 KG/M2 | WEIGHT: 97 LBS | OXYGEN SATURATION: 97 % | HEIGHT: 60 IN | HEART RATE: 70 BPM | DIASTOLIC BLOOD PRESSURE: 80 MMHG

## 2022-08-26 DIAGNOSIS — Z00.00 ENCOUNTER FOR ANNUAL HEALTH EXAMINATION: Primary | ICD-10-CM

## 2022-08-26 DIAGNOSIS — I44.7 LEFT BUNDLE BRANCH BLOCK: ICD-10-CM

## 2022-08-26 DIAGNOSIS — M81.0 AGE-RELATED OSTEOPOROSIS WITHOUT CURRENT PATHOLOGICAL FRACTURE: ICD-10-CM

## 2022-08-26 DIAGNOSIS — I10 ESSENTIAL HYPERTENSION: ICD-10-CM

## 2022-08-26 DIAGNOSIS — E78.2 MIXED HYPERLIPIDEMIA: ICD-10-CM

## 2022-08-26 DIAGNOSIS — K21.9 GASTROESOPHAGEAL REFLUX DISEASE WITHOUT ESOPHAGITIS: ICD-10-CM

## 2022-08-26 DIAGNOSIS — I35.8 AORTIC VALVE SCLEROSIS: ICD-10-CM

## 2022-08-26 DIAGNOSIS — J45.20 MILD INTERMITTENT ASTHMA WITHOUT COMPLICATION: ICD-10-CM

## 2022-08-26 PROCEDURE — G0439 PPPS, SUBSEQ VISIT: HCPCS | Performed by: FAMILY MEDICINE

## 2022-08-26 PROCEDURE — 99397 PER PM REEVAL EST PAT 65+ YR: CPT | Performed by: FAMILY MEDICINE

## 2022-08-26 PROCEDURE — 3079F DIAST BP 80-89 MM HG: CPT | Performed by: FAMILY MEDICINE

## 2022-08-26 PROCEDURE — 3075F SYST BP GE 130 - 139MM HG: CPT | Performed by: FAMILY MEDICINE

## 2022-08-26 PROCEDURE — 96160 PT-FOCUSED HLTH RISK ASSMT: CPT | Performed by: FAMILY MEDICINE

## 2022-08-26 PROCEDURE — 1125F AMNT PAIN NOTED PAIN PRSNT: CPT | Performed by: FAMILY MEDICINE

## 2022-08-26 PROCEDURE — 3008F BODY MASS INDEX DOCD: CPT | Performed by: FAMILY MEDICINE

## 2022-09-15 ENCOUNTER — TELEPHONE (OUTPATIENT)
Dept: FAMILY MEDICINE CLINIC | Facility: CLINIC | Age: 77
End: 2022-09-15

## 2022-09-15 NOTE — TELEPHONE ENCOUNTER
Received immunization report from [address] Patient received pneumo 20. Report has been abstracted. HM updated.

## 2022-09-20 ENCOUNTER — TELEPHONE (OUTPATIENT)
Dept: FAMILY MEDICINE CLINIC | Facility: CLINIC | Age: 77
End: 2022-09-20

## 2022-09-20 DIAGNOSIS — Z12.31 ENCOUNTER FOR SCREENING MAMMOGRAM FOR BREAST CANCER: Primary | ICD-10-CM

## 2023-01-05 ENCOUNTER — TELEPHONE (OUTPATIENT)
Dept: FAMILY MEDICINE CLINIC | Facility: CLINIC | Age: 78
End: 2023-01-05

## 2023-01-05 NOTE — TELEPHONE ENCOUNTER
Pt would like to schedule her next Prolia shot. Please advise Pt what labs need to be completed and when she is due for Prolia. Please call Pt. Please advise  when Pt is ready to be scheduled.

## 2023-01-25 LAB
ALBUMIN/GLOBULIN RATIO: 2 (CALC) (ref 1–2.5)
ALBUMIN: 4.6 G/DL (ref 3.6–5.1)
ALKALINE PHOSPHATASE: 75 U/L (ref 37–153)
ALT: 13 U/L (ref 6–29)
AST: 19 U/L (ref 10–35)
BILIRUBIN, TOTAL: 0.6 MG/DL (ref 0.2–1.2)
BUN: 16 MG/DL (ref 7–25)
CALCIUM: 9.7 MG/DL (ref 8.6–10.4)
CARBON DIOXIDE: 32 MMOL/L (ref 20–32)
CHLORIDE: 105 MMOL/L (ref 98–110)
CREATININE: 0.74 MG/DL (ref 0.6–1)
EGFR: 82 ML/MIN/1.73M2
GLOBULIN: 2.3 G/DL (CALC) (ref 1.9–3.7)
GLUCOSE: 105 MG/DL (ref 65–99)
HEMOGLOBIN A1C: 5.6 % OF TOTAL HGB
MAGNESIUM: 2.1 MG/DL (ref 1.5–2.5)
PHOSPHATE (AS PHOSPHORUS): 5.1 MG/DL (ref 2.1–4.3)
POTASSIUM: 4.3 MMOL/L (ref 3.5–5.3)
PROTEIN, TOTAL: 6.9 G/DL (ref 6.1–8.1)
SODIUM: 142 MMOL/L (ref 135–146)

## 2023-01-27 ENCOUNTER — OFFICE VISIT (OUTPATIENT)
Dept: FAMILY MEDICINE CLINIC | Facility: CLINIC | Age: 78
End: 2023-01-27
Payer: COMMERCIAL

## 2023-01-27 VITALS
BODY MASS INDEX: 19.73 KG/M2 | WEIGHT: 100.5 LBS | RESPIRATION RATE: 16 BRPM | SYSTOLIC BLOOD PRESSURE: 130 MMHG | HEART RATE: 78 BPM | DIASTOLIC BLOOD PRESSURE: 60 MMHG | OXYGEN SATURATION: 98 % | HEIGHT: 60 IN | TEMPERATURE: 97 F

## 2023-01-27 DIAGNOSIS — Z79.899 MEDICATION MANAGEMENT: ICD-10-CM

## 2023-01-27 DIAGNOSIS — E83.39 HIGH PHOSPHATE LEVELS: ICD-10-CM

## 2023-01-27 DIAGNOSIS — I10 ESSENTIAL HYPERTENSION: ICD-10-CM

## 2023-01-27 DIAGNOSIS — M81.0 AGE-RELATED OSTEOPOROSIS WITHOUT CURRENT PATHOLOGICAL FRACTURE: ICD-10-CM

## 2023-01-27 DIAGNOSIS — E78.2 MIXED HYPERLIPIDEMIA: ICD-10-CM

## 2023-01-27 DIAGNOSIS — I44.7 LBBB (LEFT BUNDLE BRANCH BLOCK): Primary | ICD-10-CM

## 2023-01-27 PROCEDURE — 3078F DIAST BP <80 MM HG: CPT | Performed by: FAMILY MEDICINE

## 2023-01-27 PROCEDURE — 3075F SYST BP GE 130 - 139MM HG: CPT | Performed by: FAMILY MEDICINE

## 2023-01-27 PROCEDURE — 99214 OFFICE O/P EST MOD 30 MIN: CPT | Performed by: FAMILY MEDICINE

## 2023-01-27 PROCEDURE — 3008F BODY MASS INDEX DOCD: CPT | Performed by: FAMILY MEDICINE

## 2023-01-30 ENCOUNTER — NURSE ONLY (OUTPATIENT)
Dept: FAMILY MEDICINE CLINIC | Facility: CLINIC | Age: 78
End: 2023-01-30
Payer: COMMERCIAL

## 2023-01-30 DIAGNOSIS — M81.0 AGE-RELATED OSTEOPOROSIS WITHOUT CURRENT PATHOLOGICAL FRACTURE: Primary | ICD-10-CM

## 2023-01-30 PROCEDURE — 96372 THER/PROPH/DIAG INJ SC/IM: CPT | Performed by: FAMILY MEDICINE

## 2023-01-30 NOTE — PROGRESS NOTES
Pt here for Prolia injection. Given subQ in L upper arm. Pt tolerated well with no adverse events.     Next injection due in 6 months, after 7/30/23

## 2023-02-04 ENCOUNTER — HOSPITAL ENCOUNTER (OUTPATIENT)
Dept: MAMMOGRAPHY | Age: 78
Discharge: HOME | End: 2023-02-04
Attending: FAMILY MEDICINE
Payer: MEDICARE

## 2023-02-04 DIAGNOSIS — Z12.31 ENCOUNTER FOR SCREENING MAMMOGRAM FOR BREAST CANCER: ICD-10-CM

## 2023-02-04 PROCEDURE — 77067 SCR MAMMO BI INCL CAD: CPT | Performed by: FAMILY MEDICINE

## 2023-02-04 PROCEDURE — 77063 BREAST TOMOSYNTHESIS BI: CPT | Performed by: FAMILY MEDICINE

## 2023-04-03 ENCOUNTER — OFFICE VISIT (OUTPATIENT)
Dept: FAMILY MEDICINE CLINIC | Facility: CLINIC | Age: 78
End: 2023-04-03
Payer: COMMERCIAL

## 2023-04-03 VITALS
SYSTOLIC BLOOD PRESSURE: 118 MMHG | DIASTOLIC BLOOD PRESSURE: 68 MMHG | TEMPERATURE: 97 F | HEIGHT: 60 IN | BODY MASS INDEX: 19.63 KG/M2 | HEART RATE: 84 BPM | OXYGEN SATURATION: 97 % | RESPIRATION RATE: 16 BRPM | WEIGHT: 100 LBS

## 2023-04-03 DIAGNOSIS — J01.10 ACUTE NON-RECURRENT FRONTAL SINUSITIS: Primary | ICD-10-CM

## 2023-04-03 PROCEDURE — 99213 OFFICE O/P EST LOW 20 MIN: CPT | Performed by: FAMILY MEDICINE

## 2023-04-03 PROCEDURE — 3078F DIAST BP <80 MM HG: CPT | Performed by: FAMILY MEDICINE

## 2023-04-03 PROCEDURE — 3074F SYST BP LT 130 MM HG: CPT | Performed by: FAMILY MEDICINE

## 2023-04-03 PROCEDURE — 3008F BODY MASS INDEX DOCD: CPT | Performed by: FAMILY MEDICINE

## 2023-04-03 RX ORDER — AMOXICILLIN AND CLAVULANATE POTASSIUM 875; 125 MG/1; MG/1
1 TABLET, FILM COATED ORAL 2 TIMES DAILY
Qty: 20 TABLET | Refills: 0 | Status: SHIPPED | OUTPATIENT
Start: 2023-04-03 | End: 2023-04-13

## 2023-04-20 ENCOUNTER — TELEPHONE (OUTPATIENT)
Dept: FAMILY MEDICINE CLINIC | Facility: CLINIC | Age: 78
End: 2023-04-20

## 2023-04-20 NOTE — TELEPHONE ENCOUNTER
Miko Pete- Pt is calling to ask about how long her cough will linger? She does feel better but has the dry cough. Declined appointment and will come in if needed. Pt states she just wanted to ask that question. Offered phone visit as well.     Ph. [phone]

## 2023-04-21 RX ORDER — ALBUTEROL SULFATE 90 UG/1
2 AEROSOL, METERED RESPIRATORY (INHALATION) EVERY 4 HOURS PRN
Qty: 8 G | Refills: 0 | Status: SHIPPED | OUTPATIENT
Start: 2023-04-21

## 2023-04-21 RX ORDER — METHYLPREDNISOLONE 4 MG/1
TABLET ORAL
Qty: 21 EACH | Refills: 0 | Status: SHIPPED | OUTPATIENT
Start: 2023-04-21

## 2023-05-04 ENCOUNTER — PATIENT MESSAGE (OUTPATIENT)
Dept: FAMILY MEDICINE CLINIC | Facility: CLINIC | Age: 78
End: 2023-05-04

## 2023-05-04 NOTE — TELEPHONE ENCOUNTER
From: Jesus Lloyd  To: Brianne Acosta  Sent: 5/4/2023 8:03 AM CDT  Subject: Balance    Good morning, Dufm . Just wanted to let you know that I reached out to billing in regards to the copay and balance in your account. The response that I received was that the copay was $20.00 and that the appointments had been billed correctly. I sent the billing department another message because your copay is only $10.00. I am still waiting to hear back and will call you when I have some news. Hope you are doing well!   Take care,  Rip Adam

## 2023-05-13 ENCOUNTER — HOSPITAL ENCOUNTER (EMERGENCY)
Age: 78
Discharge: HOME | End: 2023-05-13
Attending: EMERGENCY MEDICINE
Payer: MEDICARE

## 2023-05-13 ENCOUNTER — APPOINTMENT (OUTPATIENT)
Dept: CT IMAGING | Age: 78
End: 2023-05-13
Attending: EMERGENCY MEDICINE
Payer: MEDICARE

## 2023-05-13 VITALS
BODY MASS INDEX: 19.63 KG/M2 | DIASTOLIC BLOOD PRESSURE: 53 MMHG | OXYGEN SATURATION: 100 % | RESPIRATION RATE: 16 BRPM | TEMPERATURE: 98 F | WEIGHT: 100 LBS | HEART RATE: 81 BPM | SYSTOLIC BLOOD PRESSURE: 134 MMHG | HEIGHT: 60 IN

## 2023-05-13 DIAGNOSIS — K59.00 CONSTIPATION, UNSPECIFIED CONSTIPATION TYPE: Primary | ICD-10-CM

## 2023-05-13 LAB
ALBUMIN SERPL-MCNC: 3.9 G/DL (ref 3.4–5)
ALBUMIN/GLOB SERPL: 1.1 {RATIO} (ref 1–2)
ALP LIVER SERPL-CCNC: 85 U/L
ALT SERPL-CCNC: 21 U/L
ANION GAP SERPL CALC-SCNC: 4 MMOL/L (ref 0–18)
AST SERPL-CCNC: 19 U/L (ref 15–37)
BASOPHILS # BLD AUTO: 0.02 X10(3) UL (ref 0–0.2)
BASOPHILS NFR BLD AUTO: 0.2 %
BILIRUB SERPL-MCNC: 0.4 MG/DL (ref 0.1–2)
BILIRUB UR QL STRIP.AUTO: NEGATIVE
BUN BLD-MCNC: 16 MG/DL (ref 7–18)
CALCIUM BLD-MCNC: 9.1 MG/DL (ref 8.5–10.1)
CHLORIDE SERPL-SCNC: 105 MMOL/L (ref 98–112)
CLARITY UR REFRACT.AUTO: CLEAR
CO2 SERPL-SCNC: 26 MMOL/L (ref 21–32)
COLOR UR AUTO: YELLOW
CREAT BLD-MCNC: 0.72 MG/DL
EOSINOPHIL # BLD AUTO: 0.02 X10(3) UL (ref 0–0.7)
EOSINOPHIL NFR BLD AUTO: 0.2 %
ERYTHROCYTE [DISTWIDTH] IN BLOOD BY AUTOMATED COUNT: 13.5 %
GFR SERPLBLD BASED ON 1.73 SQ M-ARVRAT: 85 ML/MIN/1.73M2 (ref 60–?)
GLOBULIN PLAS-MCNC: 3.6 G/DL (ref 2.8–4.4)
GLUCOSE BLD-MCNC: 132 MG/DL (ref 70–99)
GLUCOSE UR STRIP.AUTO-MCNC: NEGATIVE MG/DL
HCT VFR BLD AUTO: 40 %
HGB BLD-MCNC: 13.1 G/DL
IMM GRANULOCYTES # BLD AUTO: 0.03 X10(3) UL (ref 0–1)
IMM GRANULOCYTES NFR BLD: 0.3 %
KETONES UR STRIP.AUTO-MCNC: NEGATIVE MG/DL
LEUKOCYTE ESTERASE UR QL STRIP.AUTO: NEGATIVE
LIPASE SERPL-CCNC: 55 U/L (ref 13–75)
LYMPHOCYTES # BLD AUTO: 0.63 X10(3) UL (ref 1–4)
LYMPHOCYTES NFR BLD AUTO: 6.3 %
MCH RBC QN AUTO: 28.1 PG (ref 26–34)
MCHC RBC AUTO-ENTMCNC: 32.8 G/DL (ref 31–37)
MCV RBC AUTO: 85.8 FL
MONOCYTES # BLD AUTO: 0.44 X10(3) UL (ref 0.1–1)
MONOCYTES NFR BLD AUTO: 4.4 %
NEUTROPHILS # BLD AUTO: 8.9 X10 (3) UL (ref 1.5–7.7)
NEUTROPHILS # BLD AUTO: 8.9 X10(3) UL (ref 1.5–7.7)
NEUTROPHILS NFR BLD AUTO: 88.6 %
NITRITE UR QL STRIP.AUTO: NEGATIVE
OSMOLALITY SERPL CALC.SUM OF ELEC: 283 MOSM/KG (ref 275–295)
PH UR STRIP.AUTO: 6.5 [PH] (ref 5–8)
PLATELET # BLD AUTO: 255 10(3)UL (ref 150–450)
POTASSIUM SERPL-SCNC: 3.9 MMOL/L (ref 3.5–5.1)
PROT SERPL-MCNC: 7.5 G/DL (ref 6.4–8.2)
PROT UR STRIP.AUTO-MCNC: NEGATIVE MG/DL
RBC # BLD AUTO: 4.66 X10(6)UL
SODIUM SERPL-SCNC: 135 MMOL/L (ref 136–145)
SP GR UR STRIP.AUTO: 1.02 (ref 1–1.03)
UROBILINOGEN UR STRIP.AUTO-MCNC: 0.2 MG/DL
WBC # BLD AUTO: 10 X10(3) UL (ref 4–11)

## 2023-05-13 PROCEDURE — 74177 CT ABD & PELVIS W/CONTRAST: CPT | Performed by: EMERGENCY MEDICINE

## 2023-05-13 PROCEDURE — 96361 HYDRATE IV INFUSION ADD-ON: CPT

## 2023-05-13 PROCEDURE — 83690 ASSAY OF LIPASE: CPT | Performed by: EMERGENCY MEDICINE

## 2023-05-13 PROCEDURE — 81015 MICROSCOPIC EXAM OF URINE: CPT | Performed by: EMERGENCY MEDICINE

## 2023-05-13 PROCEDURE — 81001 URINALYSIS AUTO W/SCOPE: CPT | Performed by: EMERGENCY MEDICINE

## 2023-05-13 PROCEDURE — 99284 EMERGENCY DEPT VISIT MOD MDM: CPT

## 2023-05-13 PROCEDURE — 96375 TX/PRO/DX INJ NEW DRUG ADDON: CPT

## 2023-05-13 PROCEDURE — 99285 EMERGENCY DEPT VISIT HI MDM: CPT

## 2023-05-13 PROCEDURE — 96374 THER/PROPH/DIAG INJ IV PUSH: CPT

## 2023-05-13 PROCEDURE — 80053 COMPREHEN METABOLIC PANEL: CPT | Performed by: EMERGENCY MEDICINE

## 2023-05-13 PROCEDURE — 85025 COMPLETE CBC W/AUTO DIFF WBC: CPT | Performed by: EMERGENCY MEDICINE

## 2023-05-13 RX ORDER — LIDOCAINE HYDROCHLORIDE 20 MG/ML
5 JELLY TOPICAL ONCE
Status: DISCONTINUED | OUTPATIENT
Start: 2023-05-13 | End: 2023-05-13

## 2023-05-13 RX ORDER — ONDANSETRON 8 MG/1
8 TABLET, ORALLY DISINTEGRATING ORAL EVERY 6 HOURS PRN
Qty: 10 TABLET | Refills: 0 | Status: SHIPPED | OUTPATIENT
Start: 2023-05-13

## 2023-05-13 RX ORDER — ONDANSETRON 2 MG/ML
4 INJECTION INTRAMUSCULAR; INTRAVENOUS
Status: DISCONTINUED | OUTPATIENT
Start: 2023-05-13 | End: 2023-05-13

## 2023-05-13 RX ORDER — KETOROLAC TROMETHAMINE 15 MG/ML
15 INJECTION, SOLUTION INTRAMUSCULAR; INTRAVENOUS ONCE
Status: COMPLETED | OUTPATIENT
Start: 2023-05-13 | End: 2023-05-13

## 2023-05-13 RX ORDER — SODIUM CHLORIDE 9 MG/ML
125 INJECTION, SOLUTION INTRAVENOUS CONTINUOUS
Status: DISCONTINUED | OUTPATIENT
Start: 2023-05-13 | End: 2023-05-13

## 2023-05-13 NOTE — ED INITIAL ASSESSMENT (HPI)
Left lower abdom pain that woke me up at 0100, pain comes and goes, nausea, no vomiting, feels constipated, not sure when last BM was, took Miralax at 0600 without results

## 2023-05-13 NOTE — DISCHARGE INSTRUCTIONS
Clear liquid diet until you feel that you have had good evacuations. Push fluids  You may repeat the dose of MiraLAX today.   You may administer a fleets enema to yourself at home  Magnesium citrate if available may stimulate the bowels      Once you have had good evacuations, you may gradually advance your diet with an emphasis on high-fiber foods and extra fluid  Take a stool softener like Colace daily  Consider a fiber supplement if you are not getting the recommended amount of fiber in your diet    Contact your primary care doctor

## 2023-08-23 ENCOUNTER — TELEPHONE (OUTPATIENT)
Dept: FAMILY MEDICINE CLINIC | Facility: CLINIC | Age: 78
End: 2023-08-23

## 2023-10-09 ENCOUNTER — PATIENT OUTREACH (OUTPATIENT)
Dept: CASE MANAGEMENT | Age: 78
End: 2023-10-09

## 2023-10-09 NOTE — PROCEDURES
The office order for PCP removal request is Denied and finalized on October 9, 2023. [facility] Medicare Advantage patient is listed on the September 2023 [facility] eligibility list.   Patient is assigned to [facility]: [facility] and assigned PCP is Dr. Kitty Perez (Catherine/Nusrat). Office, please contact patient to inform must contact [facility] to provide the name of current PCP. Office please hold until the October [facility] eligibility list is available and then please verify if patient is listed.  After office has verified patient is no longer listed on the [facility] monthly eligibility list and not assigned to Dr. Kitty Perez (Catherine/Nusrat) then office can resubmit a new order for PCP removal request.    Thanks,  [id number] [employer] [facility] Team

## 2023-10-17 ENCOUNTER — TELEPHONE (OUTPATIENT)
Dept: FAMILY MEDICINE CLINIC | Facility: CLINIC | Age: 78
End: 2023-10-17

## 2023-10-17 NOTE — TELEPHONE ENCOUNTER
Ran pts insurance through [employer]. PCP listed as \"Non Par PCP Selected, [id number] [facility] [facility] [address].

## 2023-11-16 ENCOUNTER — TELEPHONE (OUTPATIENT)
Dept: FAMILY MEDICINE CLINIC | Facility: CLINIC | Age: 78
End: 2023-11-16

## 2023-11-16 NOTE — TELEPHONE ENCOUNTER
Spoke to pt who has changed PCPs to a [place]. Insurance has \"Non Par PCP selected\" as the PCP. See previous Maria from 8/23/23 and 10/17/23.

## 2024-02-08 ENCOUNTER — TELEPHONE (OUTPATIENT)
Dept: FAMILY MEDICINE CLINIC | Facility: CLINIC | Age: 79
End: 2024-02-08

## 2024-02-08 NOTE — TELEPHONE ENCOUNTER
Pt now sees Dr. Lindsey Queen with [facility]. Her insurance states: Service Location: NON PAR PCP SELECTED. Phone: [phone]  Patient has Medicare Advantage Plan: [facility] [facility] MEDICARE ADVANTAGE (PP.

## 2024-02-14 ENCOUNTER — PATIENT OUTREACH (OUTPATIENT)
Dept: CASE MANAGEMENT | Age: 79
End: 2024-02-14

## 2024-02-14 NOTE — PROCEDURES
The office order for PCP removal request is Denied and finalized on February 14, 2024.      [facility] Medicare Advantage patient is listed on the January 2024 [facility] eligibility list:    Patient is assigned to [facility]: [facility] and assigned PCP is Dr. Farmer.    Office, please contact patient to verify PCP. Inform patient they must contact [facility] to provide the name of current PCP.     Office please hold until the February and March [facility] eligibility lists are available and then please verify if patient is listed. After office has verified patient is no longer listed on the [facility] monthly eligibility list and not assigned to Dr. Farmer then office can resubmit a new order for PCP removal request.      Thanks,  [facility] [email or web address] Team

## 2024-03-05 ENCOUNTER — APPOINTMENT (OUTPATIENT)
Dept: CT IMAGING | Age: 79
End: 2024-03-05
Attending: PHYSICIAN ASSISTANT
Payer: MEDICARE

## 2024-03-05 ENCOUNTER — APPOINTMENT (OUTPATIENT)
Dept: GENERAL RADIOLOGY | Age: 79
End: 2024-03-05
Payer: MEDICARE

## 2024-03-05 ENCOUNTER — HOSPITAL ENCOUNTER (EMERGENCY)
Age: 79
Discharge: HOME | End: 2024-03-05
Attending: EMERGENCY MEDICINE
Payer: MEDICARE

## 2024-03-05 VITALS
TEMPERATURE: 97 F | HEART RATE: 69 BPM | OXYGEN SATURATION: 95 % | DIASTOLIC BLOOD PRESSURE: 58 MMHG | SYSTOLIC BLOOD PRESSURE: 166 MMHG | BODY MASS INDEX: 20.03 KG/M2 | WEIGHT: 102 LBS | RESPIRATION RATE: 20 BRPM | HEIGHT: 60 IN

## 2024-03-05 DIAGNOSIS — S00.03XA CONTUSION OF SCALP, INITIAL ENCOUNTER: Primary | ICD-10-CM

## 2024-03-05 DIAGNOSIS — S43.401A SPRAIN OF RIGHT SHOULDER, UNSPECIFIED SHOULDER SPRAIN TYPE, INITIAL ENCOUNTER: ICD-10-CM

## 2024-03-05 PROCEDURE — 70450 CT HEAD/BRAIN W/O DYE: CPT | Performed by: PHYSICIAN ASSISTANT

## 2024-03-05 PROCEDURE — 73030 X-RAY EXAM OF SHOULDER: CPT | Performed by: EMERGENCY MEDICINE

## 2024-03-05 PROCEDURE — 72125 CT NECK SPINE W/O DYE: CPT | Performed by: PHYSICIAN ASSISTANT

## 2024-03-05 PROCEDURE — 99284 EMERGENCY DEPT VISIT MOD MDM: CPT

## 2024-03-05 PROCEDURE — 73060 X-RAY EXAM OF HUMERUS: CPT | Performed by: EMERGENCY MEDICINE

## 2024-03-06 NOTE — DISCHARGE INSTRUCTIONS
Follow-up with either orthopedics or primary care physician for consideration of physical therapy for the right shoulder.  Wear sling but also removed perform range of motion exercises.  Ice and ibuprofen.

## 2024-03-06 NOTE — ED PROVIDER NOTES
Patient Seen in: [facility] In [place]      History     Chief Complaint   Patient presents with    Head Neck Injury    Fall     Stated Complaint: fall, right arm and head injury    Subjective:   HPI    78-year-old female.  Arrives with her son.  This evening, the patient tripped over her dog, while at home and fell.  She landed on her right shoulder.  She has had difficulty with range of motion since the fall.  She also struck her right forehead.  Protuberant hematoma to the scalp.  Denies loss of consciousness.  Denies dental injury.  Has a slight headache upon arrival.  Mild neck pain.  No nausea or vomiting.  She has not on any blood thinners.    Objective:   Past Medical History:   Diagnosis Date    Aortic valve sclerosis 06/29/2012    Seen on Echo 2012, asymptomatic.  Negative stress test 2013.    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  Does not want to restart.      Other and unspecified ovarian cyst 06/29/2012    Other psoriasis     Pre-diabetes     resolved 1/23    Wears glasses               Past Surgical History:   Procedure Laterality Date    COLONOSCOPY  01/2003    COLONOSCOPY  01/19/2023    normal, told no further needed, Dr. Jones Meier    CYST ASPIRATION RIGHT Right 2000    benign.    OTHER SURGICAL HISTORY      oral surgery                Social History     Socioeconomic History    Marital status:     Number of children: 1   Occupational History    Occupation:  SUPERVISOR     Employer: [employer]      Comment:    Tobacco Use    Smoking status: Former     Types: Cigarettes     Quit date: 1985     Years since quittin.0    Smokeless tobacco: Never   Vaping Use    Vaping Use: Never used   Substance and Sexual Activity    Alcohol use: Yes     Comment: rare    Drug use: No    Sexual activity: Not Currently     Partners: Male   Other Topics Concern    Caffeine Concern Yes     Comment: 1 Coffee Daily    Exercise Yes     Comment: Walking during the week              Review of Systems    Positive for stated complaint: fall, right arm and head injury  Other systems are as noted in HPI.  Constitutional and vital signs reviewed.      All other systems reviewed and negative except as noted above.    Physical Exam     ED Triage Vitals [24]   BP (!) 166/58   Pulse 69   Resp 20   Temp 97.4 °F (36.3 °C)   Temp src Oral   SpO2 95 %   O2 Device None (Room air)       Current:BP (!) 166/58   Pulse 69   Temp 97.4 °F (36.3 °C) (Oral)   Resp 20   Ht 152.4 cm (5')   Wt 46.3 kg   SpO2 95%   BMI 19.92 kg/m²         Physical Exam    Gen: Well appearing, well groomed, alert and aware x 3  Neck: Supple, full range of motion.  Generalized right paracervical tenderness to palpation  Eye examination: EOMs are intact, normal conjunctival  ENT: Atraumatic.  No Bennett sign, raccoon sign or hemotympanum.  Small hematoma to the right hairline.  Lung: No distress, RR, no retraction  Extremities: Generalized pain palpation to the right shoulder.  Difficulty with range of motion anteriorly or laterally.  No pain to palpation to the elbow.  Pronates and supinates without difficulty.  Excellent  strength.  Back: Full range of motion  Skin: No sign of trauma, Skin warm and dry, no induration or sign of infection.   Neuro: Cranial nerves intact (taste and smell omited), Normal Gait.Extremity strength is 5/5 and equal bilaterally. Sensation is equal bilaterally.    ED Course   Labs Reviewed - No data to  display          CT SPINE CERVICAL (CPT=72125)    Result Date: 3/5/2024  PROCEDURE:  CT SPINE CERVICAL (CPT=72125)  COMPARISON:  None.  INDICATIONS:  fall, right arm and head injury  TECHNIQUE:  Noncontrast CT scanning of the cervical spine is performed from the skull base through C7.  Multiplanar reconstructions are generated.  Dose reduction techniques were used. Dose information is transmitted to the ACR (American College of Radiology) NRDR (National Radiology Data Registry) which includes the Dose Index Registry.  PATIENT STATED HISTORY: (As transcribed by Technologist)  Patient fell today and hit right forehead.    FINDINGS:   CERVICAL SPINE: There is no evidence for cervical spine fracture, traumatic subluxation, prevertebral swelling, scoliosis or other malalignment.  FACETS:  Facet articulations show no acute offset, or acute appearing asymmetry.  CRANIOCERVICAL JUNCTION:  The craniocervical junction appears preserved. The C1-C2 junction appears preserved.  DEGENERATIVE CHANGES:  There are degenerative changes in the spine.  LIMITED UPPER CHEST:  No acute process seen.  Chronic scarring and calcifications.               CONCLUSION:  Degenerative changes, but no fracture or traumatic subluxation    LOCATION:  [facility]   Dictated by (CST): Gonzalez Bermudez MD on 3/05/2024 at 9:53 PM     Finalized by (CST): Gonzalez Bermudez MD on 3/05/2024 at 9:54 PM       CT BRAIN OR HEAD (19222)    Result Date: 3/5/2024  PROCEDURE:  CT BRAIN OR HEAD (89278)  COMPARISON:  None.  INDICATIONS:  fall, right arm and head injuryHeadache, pain involving the head, after head injury.  TECHNIQUE:  Noncontrast CT scanning is performed through the brain. Dose reduction techniques were used. Dose information is transmitted to the ACR (American College of Radiology) NRDR (National Radiology Data Registry) which includes the Dose Index Registry.  PATIENT STATED HISTORY: (As transcribed by Technologist)  Patient fell today and hit right  forehead, bruising and swelling.    FINDINGS:   VENTRICLES/SULCI:   Ventricles and sulci are prominent indicating atrophy.  INTRACRANIAL:  There are no abnormal extraaxial fluid collections.  There is no midline shift.  There are no acute appearing intraparenchymal brain abnormalities.  There is nothing specific for acute territorial infarct.  There is no hemorrhage or mass lesion.  There is chronic microvascular ischemic white matter disease in the cerebrum bilaterally.  SINUSES:  No acute sinusitis.   MASTOIDS:  The mastoids are clear.  SKULL:  No evidence for fracture or acute osseous abnormality.  OTHER:  None.            CONCLUSION:  Chronic changes in the brain, as described, but no acute intracranial bleed, or other acute intracranial process identified.    LOCATION:  [facility]   Dictated by (CST): Gonzalez Bermudez MD on 3/05/2024 at 9:52 PM     Finalized by (CST): Gonzalez Bermudez MD on 3/05/2024 at 9:53 PM       XR SHOULDER, COMPLETE (MIN 2 VIEWS), RIGHT (CPT=73030)    Result Date: 3/5/2024  PROCEDURE:  XR SHOULDER, COMPLETE (MIN 2 VIEWS), RIGHT (CPT=73030)  TECHNIQUE:  Multiple views were obtained.  COMPARISON:  None.  INDICATIONS:  fall, right arm and head injury  PATIENT STATED HISTORY: (As transcribed by Technologist)  Pain all over right shoulder joint post fall today at 7pm.    FINDINGS:  Negative for fracture, dislocation, deformity or other acute bony abnormality. Osteoarthritic changes are noted, mild degree.  No soft tissue abnormalities.             CONCLUSION:  Mild osteoarthritic changes are present. No acute fracture or other acute bony process.  LOCATION:  [facility]   Dictated by (CST): Gonzalez Bermudez MD on 3/05/2024 at 9:08 PM     Finalized by (CST): Gonzalez Bermudez MD on 3/05/2024 at 9:08 PM       XR HUMERUS (MIN 2 VIEWS), RIGHT (CPT=73060)    Result Date: 3/5/2024  PROCEDURE:  XR HUMERUS (MIN 2 VIEWS), RIGHT(CPT=73060)  INDICATIONS:  .     Pain involving the extremity, after injury.   fall, right  arm and head injury.     Pain involving the extremity, after injury.    COMPARISON:  None.  PATIENT STATED HISTORY: (As transcribed by Technologist)  Right humerus pain post fall today at 7pm. Pain worse all over right shoulder joint.    FINDINGS:  Negative for fracture, dislocation, deformity or other acute bony abnormality. Osteoarthritic changes are noted, minimal degree.  No soft tissue abnormalities.             CONCLUSION:  Minimal osteoarthritic changes are present. No acute fracture or other acute bony process.  LOCATION:  [facility]   Dictated by (CST): Gonzalez Bermudez MD on 3/05/2024 at 9:07 PM     Finalized by (CST): Gonzalez Bermudez MD on 3/05/2024 at 9:07 PM               MDM      My supervising physician was involved in the management of this patient.    Small hematoma to the right hairline.  No Bennett sign, raccoon sign or hemotympanum.  No obvious dental injury.    Low-grade diffuse right paracervical pain to palpation    Moderate pain to palpation to the humeral neck.  Difficulty with range of motion anteriorly or laterally secondary to shoulder pain.  No pain to palpation through the elbow.  Pronates and supinates without difficulty.    Ambulated without difficulty    Patient sent for plain films of the right shoulder and humerus.  CT of the brain and cervical spine.      CT of the brain and C-spine demonstrate no acute abnormalities    No acute fracture on plain films of the right shoulder or the right humerus    Patient arrived in a sling.  This was adjusted.  I do recommend orthopedic or primary care follow-up for consideration of physical therapy for the shoulder.                         Medical Decision Making      Disposition and Plan     Clinical Impression:  1. Contusion of scalp, initial encounter    2. Sprain of right shoulder, unspecified shoulder sprain type, initial encounter         Disposition:  Discharge  3/5/2024 10:10 pm    Follow-up:  Jaron Franks PA  [address] [place] [address]  [facility] [address]  [phone]    Follow up            Medications Prescribed:  Current Discharge Medication List

## 2024-03-06 NOTE — ED INITIAL ASSESSMENT (HPI)
Pt to ED s/p mechanical fall, tripped over her dog and fell. No LOC, no blood thinners. +hematoma to right forehead and right shoulder pain. Sling & ice pack applied in triage.

## 2024-03-18 ENCOUNTER — ORDER TRANSCRIPTION (OUTPATIENT)
Dept: PHYSICAL THERAPY | Facility: HOSPITAL | Age: 79
End: 2024-03-18

## 2024-03-18 DIAGNOSIS — M25.811 IMPINGEMENT OF RIGHT SHOULDER: ICD-10-CM

## 2024-03-18 DIAGNOSIS — M25.511 RIGHT SHOULDER PAIN: Primary | ICD-10-CM

## 2024-03-18 DIAGNOSIS — M75.02 ADHESIVE CAPSULITIS OF LEFT SHOULDER: ICD-10-CM

## 2024-03-19 ENCOUNTER — TELEPHONE (OUTPATIENT)
Dept: PHYSICAL THERAPY | Facility: HOSPITAL | Age: 79
End: 2024-03-19

## 2024-03-22 ENCOUNTER — OFFICE VISIT (OUTPATIENT)
Dept: PHYSICAL THERAPY | Age: 79
End: 2024-03-22
Attending: PHYSICIAN ASSISTANT
Payer: MEDICARE

## 2024-03-22 DIAGNOSIS — M25.511 RIGHT SHOULDER PAIN: Primary | ICD-10-CM

## 2024-03-22 DIAGNOSIS — M25.811 IMPINGEMENT OF RIGHT SHOULDER: ICD-10-CM

## 2024-03-22 DIAGNOSIS — M75.02 ADHESIVE CAPSULITIS OF LEFT SHOULDER: ICD-10-CM

## 2024-03-22 PROCEDURE — 97162 PT EVAL MOD COMPLEX 30 MIN: CPT | Performed by: PHYSICAL THERAPIST

## 2024-03-22 NOTE — PROGRESS NOTES
SHOULDER EVALUATION:     Diagnosis:   Right shoulder pain (M25.511)  Impingement of right shoulder (M25.811)  Adhesive capsulitis of right shoulder (M75.02)      Referring Provider: Moises  Date of Evaluation:    3/22/2024    Precautions:  None Next MD visit:   none scheduled  Date of Surgery: n/a     PATIENT SUMMARY   Caroline Esquivel is a 78 year old, R handed, female who presents to therapy today with complaints of R shoulder and arm pain and has a hard time raising her R arm. She drove today for the 1st time in almost 3 weeks, she is getting depressed staying on the house. On March 5, 2024, she was walking her dog and tripped and she hit the sidewalk, she fell and landed on her R shoulder and head. She was brought to the ER by her son, Had x-ray on the R shoulder and CT scan. X-rays showed no acute fracture. Patient sought consult  with ortho and was  given steroid shot and recommended for PT.   Pt describes pain level current 5-6/10, at best 5-6/10, at worst 10/10. Pain is described as sharp, goes down down to the R arm and sometimes to the neck. Pain can occur even at rest. Takes Tylenol for pain, states that she has been taking pain meds more this month than in the past year because of the pain.  Current functional limitations include lifting, reaching, driving, self care and ADL, hard time with washing her back. She has been using the L arm, or bend her head down, and doing everything below the elbow level.    Caroline describes prior level of function ADLs independent with no pain, patient lives alone and volunteers. Pt goals include to be able to have less pain, to be able to drive with less pain, to be able to volunteer again.  Past medical history was reviewed with Caroline. Significant findings include  has a past medical history of Aortic valve sclerosis (06/29/2012), Asthma (HCC), Essential hypertension (04/28/2021), GERD (gastroesophageal reflux disease) (03/12/2013), Hearing loss (03/12/2013), Herpes  zoster without mention of complication (06/29/2012), Hyperlipidemia, Internal hemorrhoids without mention of complication (06/29/2012), Left bundle branch block (06/29/2012), Osteoporosis (06/29/2012), Other and unspecified ovarian cyst (06/29/2012), Other psoriasis, Pre-diabetes, and Wears glasses.     ASSESSMENT  Caroline presents to physical therapy evaluation with primary c/o R shoulder and arm pain and has a hard time raising her R arm.. The results of the objective tests and measures show LOM on the R shoulder, strength deficits, difficulty assuming supine position, decreased activity tolerance, postural deviation.  Functional deficits include but are not limited to UE dressing, self care activities, carrying, lifting, driving, and any activity that involves use of R UE.  Signs and symptoms are consistent with diagnosis of Right shoulder pain (M25.511)Impingement of right shoulder (M25.811) Adhesive capsulitis of right shoulder (M75.02). Pt and PT discussed evaluation findings, pathology, POC and HEP.  Pt voiced understanding and performs HEP correctly without reported pain. Skilled Physical Therapy is medically necessary to address the above impairments and reach functional goals.     OBJECTIVE:   Observation/Posture: FHP, rounded shoulders, cristopher medial and inferior angle winging, L shoulder higher than R  Palpation: Grade 2 tenderness on R anterior shoulder, over the greater tuberosity, R biceps  Sensation: Intact  Cervical Screen: UE DTRs - 2+  C AROM (deg) * pain  C flexion 40*, ext 10*, R lateral flexion- 30*, L lateral flex 40*, R rot - 50*, L rot 45*    AROM: (* denotes performed with pain)  Shoulder  Elbow   Flexion: supine, R 0; sitting R - 80* ; L 135,   Abduction: R 60; L 135  ER: R 70; L 60  IR: R 60; L 50  Stabilized R arm at 90 deg  All motons with pain Flexion: R WNL; L WNL  Extension: R WNL; L WNL  Supination: R WNL, L WNL  Pronation: R WNL, L WNL   PROM(deg) - R shoulder flexion- 135, abduction-  135 , ER- 80, IR-70    Accessory motion: scapular elevation    Flexibility: Severe tightness on R rotator cuff muscles, biceps, deltoids, pectoralis major muscles    Strength/MMT: (* denotes performed with pain)  Shoulder Elbow Scapular   Flexion: R 1/5 (supine); L 3+/5  Sitting R - 3-/5  Abduction: R 3-/5; L 3+/5  ER: R 2/5; L 3+/5  IR: R 2/5; L 3+/5 Flexion: R 3+/5; L 4/5  Extension: R 3/5; L 4/5  Supination: R 3+/5; L 4/5  Pronation: R 3+/5; L 4/5  Rhomboids: R 2/5, L 2/5  Mid trap: R 2/5; L 2/5   Lats: R 2/5, L 3/5  Low trap: NT    R - 40 psi, L - 40 psi  Special tests:   Empty can - positive for pain  Full can - positive for pain  Neer Impingement - positive for pain    Today’s Treatment and Response:   Pt education was provided on exam findings, treatment diagnosis, treatment plan, expectations, and prognosis. Pt was also provided recommendations for possible soreness after evaluation, modalities as needed [ice/heat], postural corrections, and pain science education .  Patient was instructed in and issued a HEP for: Access Code: [email or web address]  URL: [email or web address]  Date: 03/22/2024  Prepared by: Azra Chen    Exercises  - Seated Scapular Retraction  - 2 x daily - 7 x weekly - 1-2 sets - 10 reps  - Circular Shoulder Pendulum with Table Support  - 2 x daily - 7 x weekly - 1-2 sets - 10 reps  - Flexion-Extension Shoulder Pendulum with Table Support  - 2 x daily - 7 x weekly - 1-2 sets - 10 reps  - Horizontal Shoulder Pendulum with Table Support  - 2 x daily - 7 x weekly - 1-2 sets - 10 reps    Charges: PT Eval Moderate Complexity, 1      Total Timed Treatment: 35 min     Total Treatment Time: 35 min     Based on clinical rationale and outcome measures, this evaluation involved Moderate Complexity decision making due to 1-2 personal factors/comorbidities, 3 body structures involved/activity limitations, and evolving symptoms including changing pain levels.  PLAN OF CARE:    Goals: (to be met in 8  visits)   Pt will have decreased pain by 30%-50%in the R shoulder , arm and neck in 4 visits to be able to tolerate raising the R arm and use for ADLs.  Pt will improve shoulder flexion AROM to >120 degrees to be able to reach into overhead cabinets without pain or restriction   Pt will improve shoulder abduction AROM to >120 degrees to improve ability to don deodorant, don/doff shirts, and wash hair   Pt will increase shoulder AROM ER to 90 to be able to reach and fasten seatbelt   Pt will increase shoulder AROM IR to 70 to be able to reach in back pocket, tuck in shirt, and turn steering wheel without pain  Pt will improve shoulder strength throughout to 4/5 to improve function with UE dressing, self care, carrying, reaching, lifting, driving   Pt will demonstrate increased mid/low trap strength to 3/5 to promote improved shoulder mechanics and stabilization with lifting and reaching   Pt will be independent and compliant with comprehensive HEP to maintain progress achieved in PT.    Frequency / Duration: Patient will be seen for 2 x/week or a total of 8 visits over a 90 day period. Treatment will include: Manual Therapy, Neuromuscular Re-education, Therapeutic Exercise, Home Exercise Program instruction, and Modalities to include: Electrical stimulation (unattended) and Ultrasound    Education or treatment limitation: None  Rehab Potential:good    QuickDASH Outcome Score  Score: 31.82 % (3/21/2024  6:41 PM)      Patient/Family/Caregiver was advised of these findings, precautions, and treatment options and has agreed to actively participate in planning and for this course of care.    Thank you for your referral. Please co-sign or sign and return this letter via fax as soon as possible to [phone]. If you have any questions, please contact me at Dept: [phone]    Sincerely,  Electronically signed by therapist: Azra Chen PT  Physician's certification required: Yes  I certify the need for these services  furnished under this plan of treatment and while under my care.    X___________________________________________________ Date____________________    Certification From: 3/22/2024  To:6/20/2024

## 2024-03-26 ENCOUNTER — OFFICE VISIT (OUTPATIENT)
Dept: PHYSICAL THERAPY | Age: 79
End: 2024-03-26
Attending: PHYSICIAN ASSISTANT
Payer: MEDICARE

## 2024-03-26 PROCEDURE — 97140 MANUAL THERAPY 1/> REGIONS: CPT | Performed by: PHYSICAL THERAPIST

## 2024-03-26 PROCEDURE — 97110 THERAPEUTIC EXERCISES: CPT | Performed by: PHYSICAL THERAPIST

## 2024-03-26 NOTE — PROGRESS NOTES
Diagnosis:   Right shoulder pain (M25.511)  Impingement of right shoulder (M25.811)  Adhesive capsulitis of right shoulder (M75.02)      Referring Provider: Alyssa De Leon  Date of Evaluation:    3/22/2024    Precautions:  None Next MD visit:   none scheduled  Date of Surgery: n/a   Insurance Primary/Secondary: [employer] / N/A     # Auth Visits: n/a            Subjective: I did not sleep very well last night. She has been sleeping on her back, she is up a little, she feels that generally     Pain: 5-6/10      Objective: gait deviation noted, decreased R arm swing. Trigger points noted on the R supraspinatus, infraspinatus, teres muscles. TTP on R biceps, pectoral muscles, rotator cuff muscles.      Assessment: Patient tolerated treatment well. Initiated therapeutic exercises today. Treatment time includes explanation of intended effect of each intervention, instruction before and during exercises, including cues for proper form with wand exercises and wall slides and performance, assessment of patient's response to each activity and education to improve performance of active intervention and good comprehension of treatment plan to improve patient participation and compliance. Intervention adjusted to patient's tolerance throughout session duration due to pain. Still apprehensive for onset of pain during ROM exercises      Goals:   Goals: (to be met in 8 visits)   Pt will have decreased pain by 30%-50%in the R shoulder , arm and neck in 4 visits to be able to tolerate raising the R arm and use for ADLs.  Pt will improve shoulder flexion AROM to >120 degrees to be able to reach into overhead cabinets without pain or restriction   Pt will improve shoulder abduction AROM to >120 degrees to improve ability to don deodorant, don/doff shirts, and wash hair   Pt will increase shoulder AROM ER to 90 to be able to reach and fasten seatbelt   Pt will increase shoulder AROM IR to 70 to be able to reach in back  pocket, tuck in shirt, and turn steering wheel without pain  Pt will improve shoulder strength throughout to 4/5 to improve function with UE dressing, self care, carrying, reaching, lifting, driving   Pt will demonstrate increased mid/low trap strength to 3/5 to promote improved shoulder mechanics and stabilization with lifting and reaching   Pt will be independent and compliant with comprehensive HEP to maintain progress achieved in PT.    Plan: Continue PT as per established POC. Continue MT and AAROME to AROME  Date: 3/26/2024  TX#: 2/8 Date:                 TX#: 3/ Date:                 TX#: 4/ Date:                 TX#: 5/ Date:   Tx#: 6/   HP on R shoulder/ arm while doing UT stretches 2x 30 secs, LS stretches 2x 30 secs, cervical retractions x 10 with 5 secs   Manual therapy: STM for R rotator cuff muscles, teres, pectoralis major/minor, biceps, anterior deltoids, scapular mobs in SL position followed by PROME on R shoulder all planes x 10 reps    AAROME with dowel:  Flexion, abduction, chest press x 10 each  Seated scapular retractions x 20  Wall slides with towel x  20  Pendulum exercises, circles, flex/ext, abd/add x 20                             HEP: Access Code: [email or web address]  URL: [email or web address]  Date: 03/26/2024  Prepared by: Azra Chen    Exercises  - Seated Scapular Retraction  - 2 x daily - 7 x weekly - 1-2 sets - 10 reps  - Circular Shoulder Pendulum with Table Support  - 2 x daily - 7 x weekly - 1-2 sets - 10 reps  - Flexion-Extension Shoulder Pendulum with Table Support  - 2 x daily - 7 x weekly - 1-2 sets - 10 reps  - Horizontal Shoulder Pendulum with Table Support  - 2 x daily - 7 x weekly - 1-2 sets - 10 reps  - Seated Gentle Upper Trapezius Stretch  - 2 x daily - 7 x weekly - 1-2 sets - 3 reps - 20 hold  - Gentle Levator Scapulae Stretch  - 2 x daily - 7 x weekly - 1-2 sets - 3 reps - 20 hold  - Seated Chin Tuck with Neck Elongation  - 2 x daily - 7 x weekly - 1-2 sets - 3 reps -  20 hold  - Supine Shoulder Flexion with Dowel  - 2 x daily - 7 x weekly - 1-2 sets - 10 reps  - Supine Shoulder Abduction AAROM with Dowel  - 2 x daily - 7 x weekly - 1-2 sets - 10 reps  - Supine Shoulder Press with Dowel  - 2 x daily - 7 x weekly - 1-2 sets - 10 reps  - Shoulder Flexion Wall Slide with Towel  - 2 x daily - 7 x weekly - 1-2 sets - 10 reps    Charges: Thera -ex -2, Man therapy -1       Total Timed Treatment: 25 min  Total Treatment Time: 15 min

## 2024-03-29 ENCOUNTER — OFFICE VISIT (OUTPATIENT)
Dept: PHYSICAL THERAPY | Age: 79
End: 2024-03-29
Attending: PHYSICIAN ASSISTANT
Payer: MEDICARE

## 2024-03-29 PROCEDURE — 97140 MANUAL THERAPY 1/> REGIONS: CPT | Performed by: PHYSICAL THERAPIST

## 2024-03-29 PROCEDURE — 97110 THERAPEUTIC EXERCISES: CPT | Performed by: PHYSICAL THERAPIST

## 2024-03-29 NOTE — PROGRESS NOTES
Diagnosis:   Right shoulder pain (M25.511)  Impingement of right shoulder (M25.811)  Adhesive capsulitis of right shoulder (M75.02)      Referring Provider: Alyssa De Leon  Date of Evaluation:    3/22/2024    Precautions:  None Next MD visit:   none scheduled  Date of Surgery: n/a   Insurance Primary/Secondary: [employer] / N/A     # Auth Visits: n/a            Subjective: Mornings are better, but sometimes it just starts to be painful without any reason, the pain is sharp. She has been good with doing her initial home exercises.    Pain: 5/10 now, when driving can increase to 7-8/10      Objective: gait deviation noted, decreased R arm swing, R arm guarded. Trigger points noted on the R supraspinatus, infraspinatus, teres muscles. TTP on R biceps, pectoral muscles, rotator cuff muscles. Less apprehensive with motions. Required assist with donning and doffing sweater.      Assessment: Patient tolerated treatment well, was able to perform flexion, abduction exercises with minimal pain, ER/IR and biceps flexion, still triggers pain and stopped exercises that reproduced the pain. Patient has decreased pain with eccentric movements. PT guided patient through treatment providing visual, verbal, and tactile cues for proper joint alignment/ body positioning, exercise tempo for desired treatment effect of exercises, and assessed response to interventions. Intervention adjusted to patient's tolerance throughout session duration. Discussed importance of each intervention to maximize treatment effect.       Goals:   Goals: (to be met in 8 visits)   Pt will have decreased pain by 30%-50%in the R shoulder , arm and neck in 4 visits to be able to tolerate raising the R arm and use for ADLs.  Pt will improve shoulder flexion AROM to >120 degrees to be able to reach into overhead cabinets without pain or restriction   Pt will improve shoulder abduction AROM to >120 degrees to improve ability to don deodorant,  don/doff shirts, and wash hair   Pt will increase shoulder AROM ER to 90 to be able to reach and fasten seatbelt   Pt will increase shoulder AROM IR to 70 to be able to reach in back pocket, tuck in shirt, and turn steering wheel without pain  Pt will improve shoulder strength throughout to 4/5 to improve function with UE dressing, self care, carrying, reaching, lifting, driving   Pt will demonstrate increased mid/low trap strength to 3/5 to promote improved shoulder mechanics and stabilization with lifting and reaching   Pt will be independent and compliant with comprehensive HEP to maintain progress achieved in PT.    Plan: Continue PT as per established POC. Continue MT and AAROME to AROME, may add resistive exercises to periscapular strengthening  Date: 3/26/2024  TX#: 2/8 Date:   3/29/2024               TX#: 3/8 Date:                 TX#: 4/ Date:                 TX#: 5/ Date:   Tx#: 6/   HP on R shoulder/ arm while doing UT stretches 2x 30 secs, LS stretches 2x 30 secs, cervical retractions x 10 with 5 secs   Manual therapy: STM for R rotator cuff muscles, teres, pectoralis major/minor, biceps, anterior deltoids, scapular mobs in SL position followed by PROME on R shoulder all planes x 10 reps    AAROME with dowel:  Flexion, abduction, chest press x 10 each  Seated scapular retractions x 20  Wall slides with towel x  20  Pendulum exercises, circles, flex/ext, abd/add x 20  HP on R shoulder/ arm while doing UT stretches 2x 30 secs, LS stretches 2x 30 secs, cervical retractions x 10 with 5 secs   Manual therapy: STM for R rotator cuff muscles, teres, pectoralis major/minor, biceps, anterior deltoids, followed by PROME on R shoulder all planes x 10 reps    AAROME with dowel:  Flexion, abduction, chest press x 20 each  Attempted ER/IR- increased pain, stopped  Seated scapular retractions x 20  Wall slides flexion x 20, H abd with towel x  Biceps curls - attempted, but increased pain.  10, stopped due to fatigue                              HEP: Access Code: [email or web address]  URL: [email or web address]  Date: 03/26/2024  Prepared by: Azra Chen    Exercises  - Seated Scapular Retraction  - 2 x daily - 7 x weekly - 1-2 sets - 10 reps  - Circular Shoulder Pendulum with Table Support  - 2 x daily - 7 x weekly - 1-2 sets - 10 reps  - Flexion-Extension Shoulder Pendulum with Table Support  - 2 x daily - 7 x weekly - 1-2 sets - 10 reps  - Horizontal Shoulder Pendulum with Table Support  - 2 x daily - 7 x weekly - 1-2 sets - 10 reps  - Seated Gentle Upper Trapezius Stretch  - 2 x daily - 7 x weekly - 1-2 sets - 3 reps - 20 hold  - Gentle Levator Scapulae Stretch  - 2 x daily - 7 x weekly - 1-2 sets - 3 reps - 20 hold  - Seated Chin Tuck with Neck Elongation  - 2 x daily - 7 x weekly - 1-2 sets - 3 reps - 20 hold  - Supine Shoulder Flexion with Dowel  - 2 x daily - 7 x weekly - 1-2 sets - 10 reps  - Supine Shoulder Abduction AAROM with Dowel  - 2 x daily - 7 x weekly - 1-2 sets - 10 reps  - Supine Shoulder Press with Dowel  - 2 x daily - 7 x weekly - 1-2 sets - 10 reps  - Shoulder Flexion Wall Slide with Towel  - 2 x daily - 7 x weekly - 1-2 sets - 10 reps    Charges: Thera -ex -2, Man therapy -1       Total Timed Treatment:  45 min  Total Treatment Time: 45 min

## 2024-04-03 ENCOUNTER — OFFICE VISIT (OUTPATIENT)
Dept: PHYSICAL THERAPY | Age: 79
End: 2024-04-03
Attending: PHYSICIAN ASSISTANT
Payer: MEDICARE

## 2024-04-03 PROCEDURE — 97140 MANUAL THERAPY 1/> REGIONS: CPT | Performed by: PHYSICAL THERAPIST

## 2024-04-03 PROCEDURE — 97110 THERAPEUTIC EXERCISES: CPT | Performed by: PHYSICAL THERAPIST

## 2024-04-03 NOTE — PROGRESS NOTES
Diagnosis:   Right shoulder pain (M25.511)  Impingement of right shoulder (M25.811)  Adhesive capsulitis of right shoulder (M75.02)      Referring Provider: Alyssa De Leon  Date of Evaluation:    3/22/2024    Precautions:  None Next MD visit:   none scheduled  Date of Surgery: n/a   Insurance Primary/Secondary: [employer] / N/A     # Auth Visits: n/a            Subjective: Patient reports that the R shoulder feel sore today, last Monday she had a sharp pain, it was more in the evening, and she went to bed and she slept. Driving today was not so bad. Unable to lift a glass cup and pour.  Pain: 5-6/10 now      Objective: now no gait deviation noted, R arm less guarded. Trigger points noted on the R supraspinatus, infraspinatus, teres muscles. TTP on R biceps, pectoral muscles, rotator cuff muscles. Less apprehensive with motions. Noted ease with AROME in supine for flexion, abduction.      Assessment: Continues to progress well with  PT, noted improvement of ROM all planes, decreased pain with eccentric movements. PT guided patient through treatment providing visual, verbal, and tactile cues for proper joint alignment/ body positioning rows, Shoulder abduction, ER/IR, exercise tempo for desired treatment effect of exercises, and assessed response to interventions. Intervention adjusted to patient's tolerance throughout session duration. Discussed importance of each intervention to maximize treatment effect. Now decreased pain with ER/IR, but still presents with fatigue and required rest in between exercises.      Goals:   Goals: (to be met in 8 visits)   Pt will have decreased pain by 30%-50%in the R shoulder , arm and neck in 4 visits to be able to tolerate raising the R arm and use for ADLs.  Pt will improve shoulder flexion AROM to >120 degrees to be able to reach into overhead cabinets without pain or restriction   Pt will improve shoulder abduction AROM to >120 degrees to improve ability to don  deodorant, don/doff shirts, and wash hair   Pt will increase shoulder AROM ER to 90 to be able to reach and fasten seatbelt   Pt will increase shoulder AROM IR to 70 to be able to reach in back pocket, tuck in shirt, and turn steering wheel without pain  Pt will improve shoulder strength throughout to 4/5 to improve function with UE dressing, self care, carrying, reaching, lifting, driving   Pt will demonstrate increased mid/low trap strength to 3/5 to promote improved shoulder mechanics and stabilization with lifting and reaching   Pt will be independent and compliant with comprehensive HEP to maintain progress achieved in PT.    Plan: Continue PT as per established POC. Continue MT and AAROME to AROME, may add resistive exercises to periscapular strengthening  Date: 3/26/2024  TX#: 2/8 Date:   3/29/2024               TX#: 3/8 Date:        4/3/2024        TX#: 4/8 Date:                 TX#: 5/ Date:   Tx#: 6/   HP on R shoulder/ arm while doing UT stretches 2x 30 secs, LS stretches 2x 30 secs, cervical retractions x 10 with 5 secs   Manual therapy: STM for R rotator cuff muscles, teres, pectoralis major/minor, biceps, anterior deltoids, scapular mobs in SL position followed by PROME on R shoulder all planes x 10 reps    AAROME with dowel:  Flexion, abduction, chest press x 10 each  Seated scapular retractions x 20  Wall slides with towel x  20  Pendulum exercises, circles, flex/ext, abd/add x 20  HP on R shoulder/ arm while doing UT stretches 2x 30 secs, LS stretches 2x 30 secs, cervical retractions x 10 with 5 secs   Manual therapy: STM for R rotator cuff muscles, teres, pectoralis major/minor, biceps, anterior deltoids, followed by PROME on R shoulder all planes x 10 reps    AAROME with dowel:  Flexion, abduction, chest press x 20 each  Attempted ER/IR- increased pain, stopped  Seated scapular retractions x 20  Wall slides flexion x 20, H abd with towel x  Biceps curls - attempted, but increased pain.  10,  stopped due to fatigue   UBE 2 mins fwd  HP on R shoulder/ arm while doing UT stretches 2x 30 secs, LS stretches 2x 30 secs, cervical retractions x 10 with 5 secs   Manual therapy:  x 15 minutes STM for R rotator cuff muscles, teres, pectoralis major/minor, biceps, anterior deltoids, Grade 1 oscillations, posterior and inferior directions, followed by PROME on R shoulder all planes x 10 reps    AAROME with  1# dowel:  Flexion, abduction, chest press x 10 each  ER/IR with 1# dowel x 10  Sidelying shoulder abduction x 10    Wall slides flexion x 20, H abd with towel x 20  Standing against wall: flexion, scaption x 10 each, attempted abduction increased pain    Rows x 20 red TB                          HEP: Access Code: [email or web address]  URL: [email or web address]  Date: 03/26/2024  Prepared by: Azra Chen    Exercises  - Seated Scapular Retraction  - 2 x daily - 7 x weekly - 1-2 sets - 10 reps  - Circular Shoulder Pendulum with Table Support  - 2 x daily - 7 x weekly - 1-2 sets - 10 reps  - Flexion-Extension Shoulder Pendulum with Table Support  - 2 x daily - 7 x weekly - 1-2 sets - 10 reps  - Horizontal Shoulder Pendulum with Table Support  - 2 x daily - 7 x weekly - 1-2 sets - 10 reps  - Seated Gentle Upper Trapezius Stretch  - 2 x daily - 7 x weekly - 1-2 sets - 3 reps - 20 hold  - Gentle Levator Scapulae Stretch  - 2 x daily - 7 x weekly - 1-2 sets - 3 reps - 20 hold  - Seated Chin Tuck with Neck Elongation  - 2 x daily - 7 x weekly - 1-2 sets - 3 reps - 20 hold  - Supine Shoulder Flexion with Dowel  - 2 x daily - 7 x weekly - 1-2 sets - 10 reps  - Supine Shoulder Abduction AAROM with Dowel  - 2 x daily - 7 x weekly - 1-2 sets - 10 reps  - Supine Shoulder Press with Dowel  - 2 x daily - 7 x weekly - 1-2 sets - 10 reps  - Shoulder Flexion Wall Slide with Towel  - 2 x daily - 7 x weekly - 1-2 sets - 10 reps    Charges: Thera -ex -2, Man therapy -1       Total Timed Treatment:  40 min  Total Treatment Time: 40  min

## 2024-04-05 ENCOUNTER — OFFICE VISIT (OUTPATIENT)
Dept: PHYSICAL THERAPY | Age: 79
End: 2024-04-05
Attending: PHYSICIAN ASSISTANT
Payer: MEDICARE

## 2024-04-05 PROCEDURE — 97110 THERAPEUTIC EXERCISES: CPT | Performed by: PHYSICAL THERAPIST

## 2024-04-05 PROCEDURE — 97140 MANUAL THERAPY 1/> REGIONS: CPT | Performed by: PHYSICAL THERAPIST

## 2024-04-05 NOTE — PROGRESS NOTES
Diagnosis:   Right shoulder pain (M25.511)  Impingement of right shoulder (M25.811)  Adhesive capsulitis of right shoulder (M75.02)      Referring Provider: Alyssa De Leon  Date of Evaluation:    3/22/2024    Precautions:  None Next MD visit:   none scheduled  Date of Surgery: n/a   Insurance Primary/Secondary: [employer] / N/A     # Auth Visits: n/a            Subjective: Patient reports that she has been able to pour but still not lift a glass, it is also hard to reach to the side, if she has to get something from the passenger side. She can now wash her back, but dressing is still uncomfortable It is easier to put on clothes than take it off., . The pain overall is not as bad though, just difficulty with movement.  Pain: 3/10 now      Objective: now no gait deviation noted, R arm less guarded. Trigger points noted on the R supraspinatus, infraspinatus, teres muscles. TTP on R biceps, pectoral muscles, rotator cuff muscles. Less apprehensive with motions. Noted ease with AROME in supine for flexion, abduction.      Assessment: Continues to progress well with  PT, continues to have improvement with ROM. PT guided patient through treatment providing visual, verbal, and tactile cues for proper joint alignment/ body positioning rows, Shoulder abduction, ER/IR with dowel, rows and extensions, exercise tempo for desired treatment effect of exercises, and assessed response to interventions. Intervention adjusted to patient's tolerance throughout session duration. Discussed importance of each intervention to maximize treatment effect. Patient has tendency to pull body when doing rows, corrected the movement to prevent falls if the theraband snaps. Also educated patient to check the theraband for holes when using at home, and dispose if there are holes.  Goals:   Goals: (to be met in 8 visits)   Pt will have decreased pain by 30%-50%in the R shoulder , arm and neck in 4 visits to be able to tolerate raising  the R arm and use for ADLs.  Pt will improve shoulder flexion AROM to >120 degrees to be able to reach into overhead cabinets without pain or restriction   Pt will improve shoulder abduction AROM to >120 degrees to improve ability to don deodorant, don/doff shirts, and wash hair   Pt will increase shoulder AROM ER to 90 to be able to reach and fasten seatbelt   Pt will increase shoulder AROM IR to 70 to be able to reach in back pocket, tuck in shirt, and turn steering wheel without pain  Pt will improve shoulder strength throughout to 4/5 to improve function with UE dressing, self care, carrying, reaching, lifting, driving   Pt will demonstrate increased mid/low trap strength to 3/5 to promote improved shoulder mechanics and stabilization with lifting and reaching   Pt will be independent and compliant with comprehensive HEP to maintain progress achieved in PT.    Plan: Continue PT as per established POC. Continue MT and AAROME to AROME, may add resistive exercises to periscapular strengthening  Date: 3/26/2024  TX#: 2/8 Date:   3/29/2024               TX#: 3/8 Date:        4/3/2024        TX#: 4/8 Date: 4/5/2024                TX#: 5/8 Date:   Tx#: 6/   HP on R shoulder/ arm while doing UT stretches 2x 30 secs, LS stretches 2x 30 secs, cervical retractions x 10 with 5 secs   Manual therapy: STM for R rotator cuff muscles, teres, pectoralis major/minor, biceps, anterior deltoids, scapular mobs in SL position followed by PROME on R shoulder all planes x 10 reps    AAROME with dowel:  Flexion, abduction, chest press x 10 each  Seated scapular retractions x 20  Wall slides with towel x  20  Pendulum exercises, circles, flex/ext, abd/add x 20  HP on R shoulder/ arm while doing UT stretches 2x 30 secs, LS stretches 2x 30 secs, cervical retractions x 10 with 5 secs   Manual therapy: STM for R rotator cuff muscles, teres, pectoralis major/minor, biceps, anterior deltoids, followed by PROME on R shoulder all planes x 10  reps    AAROME with dowel:  Flexion, abduction, chest press x 20 each  Attempted ER/IR- increased pain, stopped  Seated scapular retractions x 20  Wall slides flexion x 20, H abd with towel x  Biceps curls - attempted, but increased pain.  10, stopped due to fatigue   UBE 2 mins fwd  HP on R shoulder/ arm while doing UT stretches 2x 30 secs, LS stretches 2x 30 secs, cervical retractions x 10 with 5 secs   Manual therapy:  x 15 minutes STM for R rotator cuff muscles, teres, pectoralis major/minor, biceps, anterior deltoids, Grade 1 oscillations, posterior and inferior directions, followed by PROME on R shoulder all planes x 10 reps    AAROME with  1# dowel:  Flexion, abduction, chest press x 10 each  ER/IR with 1# dowel x 10  Sidelying shoulder abduction x 10    Wall slides flexion x 20, H abd with towel x 20  Standing against wall: flexion, scaption x 10 each, attempted abduction increased pain    Rows x 20 red TB UBE L 1,  2 mins fwd, 1 min reverse  HP on R shoulder/ arm while doing UT stretches 2x 30 secs, LS stretches 2x 30 secs, cervical retractions x 10 with 5 secs   Manual therapy:  x 12 minutes, STM for R rotator cuff muscles, teres, pectoralis major/minor, biceps, anterior deltoids, Grade 1 oscillations, posterior and inferior directions, followed by PROME on R shoulder all planes x 10 reps  Attempted supine AROME flexion with 1# weight- unable to perform  AAROME with  1# dowel:  Flexion, abduction, chest press x 15, ER/IR with 1# dowel x   Sidelying shoulder abduction x 10    Wall slides flexion x 20, H abd with towel x 20  Standing against wall: flexion, scaption x 5  each, attempted using 1# DB increased pain and fatigue  Standing against wall: flexion, scaption, abduction x 10 each  Reach up to wall with 1# DB x 10  Rows x 20 red TB  Shoulder ext x 10 red TB                           HEP: Access Code: [email or web address]  URL: [email or web address]  Date: 03/26/2024  Prepared by: Azra  Chen    Exercises  - Seated Scapular Retraction  - 2 x daily - 7 x weekly - 1-2 sets - 10 reps  - Circular Shoulder Pendulum with Table Support  - 2 x daily - 7 x weekly - 1-2 sets - 10 reps  - Flexion-Extension Shoulder Pendulum with Table Support  - 2 x daily - 7 x weekly - 1-2 sets - 10 reps  - Horizontal Shoulder Pendulum with Table Support  - 2 x daily - 7 x weekly - 1-2 sets - 10 reps  - Seated Gentle Upper Trapezius Stretch  - 2 x daily - 7 x weekly - 1-2 sets - 3 reps - 20 hold  - Gentle Levator Scapulae Stretch  - 2 x daily - 7 x weekly - 1-2 sets - 3 reps - 20 hold  - Seated Chin Tuck with Neck Elongation  - 2 x daily - 7 x weekly - 1-2 sets - 3 reps - 20 hold  - Supine Shoulder Flexion with Dowel  - 2 x daily - 7 x weekly - 1-2 sets - 10 reps  - Supine Shoulder Abduction AAROM with Dowel  - 2 x daily - 7 x weekly - 1-2 sets - 10 reps  - Supine Shoulder Press with Dowel  - 2 x daily - 7 x weekly - 1-2 sets - 10 reps  - Shoulder Flexion Wall Slide with Towel  - 2 x daily - 7 x weekly - 1-2 sets - 10 reps    Charges: Thera -ex -2, Man therapy -1       Total Timed Treatment:  45 min  Total Treatment Time: 45 min

## 2024-04-10 ENCOUNTER — OFFICE VISIT (OUTPATIENT)
Dept: PHYSICAL THERAPY | Age: 79
End: 2024-04-10
Attending: PHYSICIAN ASSISTANT
Payer: MEDICARE

## 2024-04-10 PROCEDURE — 97140 MANUAL THERAPY 1/> REGIONS: CPT | Performed by: PHYSICAL THERAPIST

## 2024-04-10 PROCEDURE — 97110 THERAPEUTIC EXERCISES: CPT | Performed by: PHYSICAL THERAPIST

## 2024-04-10 NOTE — PROGRESS NOTES
Diagnosis:   Right shoulder pain (M25.511)  Impingement of right shoulder (M25.811)  Adhesive capsulitis of right shoulder (M75.02)      Referring Provider: Alyssa De Leon  Date of Evaluation:    3/22/2024    Precautions:  None Next MD visit:   none scheduled  Date of Surgery: n/a   Insurance Primary/Secondary: [employer] / N/A     # Auth Visits: n/a            Subjective: Patient reports that she was sore yesterday and today, patient was able to lift 1/2 lb candle jar with no increase in pain yesterday, and this morning  for the first time she was able to lift her cup in the microwave, and pour, with 1 hand. She felt that front of the shoulder is swollen.    Pain: 3/10 now      Objective: now no gait deviation noted, R arm less guarded.TTP on R biceps, pectoral muscles, rotator cuff muscles. Less apprehensive with motions. Noted ease with AROME in supine for flexion, abduction, ER. Demonstrated ability to perform flexion and abduction with candle jar.      Assessment: Continues to progress well with  PT, continues to have improvement with ROM.Added new exercises to increase R shoulder and scapular strength, onset of fatigue. PT guided patient through treatment providing visual, verbal, and tactile cues for proper joint alignment/ body positioning on resisted exercises with green tube, rows, extensions, ER/IR, exercise tempo for desired treatment effect of exercises, and assessed response to interventions. Intervention adjusted to patient's tolerance throughout session duration. Discussed importance of each intervention to maximize treatment effect.    Goals:   Goals: (to be met in 8 visits)   Pt will have decreased pain by 30%-50%in the R shoulder , arm and neck in 4 visits to be able to tolerate raising the R arm and use for ADLs.  Pt will improve shoulder flexion AROM to >120 degrees to be able to reach into overhead cabinets without pain or restriction   Pt will improve shoulder abduction AROM to  >120 degrees to improve ability to don deodorant, don/doff shirts, and wash hair   Pt will increase shoulder AROM ER to 90 to be able to reach and fasten seatbelt   Pt will increase shoulder AROM IR to 70 to be able to reach in back pocket, tuck in shirt, and turn steering wheel without pain  Pt will improve shoulder strength throughout to 4/5 to improve function with UE dressing, self care, carrying, reaching, lifting, driving   Pt will demonstrate increased mid/low trap strength to 3/5 to promote improved shoulder mechanics and stabilization with lifting and reaching   Pt will be independent and compliant with comprehensive HEP to maintain progress achieved in PT.    Plan: Continue PT as per established POC. Continue MT and AAROME to AROME, may add resistive exercises to periscapular strengthening  Date: 3/26/2024  TX#: 2/8 Date:   3/29/2024               TX#: 3/8 Date:        4/3/2024        TX#: 4/8 Date: 4/5/2024                TX#: 5/8 Date: 4/10/2024   Tx#: 6/8   HP on R shoulder/ arm while doing UT stretches 2x 30 secs, LS stretches 2x 30 secs, cervical retractions x 10 with 5 secs   Manual therapy: STM for R rotator cuff muscles, teres, pectoralis major/minor, biceps, anterior deltoids, scapular mobs in SL position followed by PROME on R shoulder all planes x 10 reps    AAROME with dowel:  Flexion, abduction, chest press x 10 each  Seated scapular retractions x 20  Wall slides with towel x  20  Pendulum exercises, circles, flex/ext, abd/add x 20  HP on R shoulder/ arm while doing UT stretches 2x 30 secs, LS stretches 2x 30 secs, cervical retractions x 10 with 5 secs   Manual therapy: STM for R rotator cuff muscles, teres, pectoralis major/minor, biceps, anterior deltoids, followed by PROME on R shoulder all planes x 10 reps    AAROME with dowel:  Flexion, abduction, chest press x 20 each  Attempted ER/IR- increased pain, stopped  Seated scapular retractions x 20  Wall slides flexion x 20, H abd with towel  x  Biceps curls - attempted, but increased pain.  10, stopped due to fatigue   UBE 2 mins fwd  HP on R shoulder/ arm while doing UT stretches 2x 30 secs, LS stretches 2x 30 secs, cervical retractions x 10 with 5 secs   Manual therapy:  x 15 minutes STM for R rotator cuff muscles, teres, pectoralis major/minor, biceps, anterior deltoids, Grade 1 oscillations, posterior and inferior directions, followed by PROME on R shoulder all planes x 10 reps    AAROME with  1# dowel:  Flexion, abduction, chest press x 10 each  ER/IR with 1# dowel x 10  Sidelying shoulder abduction x 10    Wall slides flexion x 20, H abd with towel x 20  Standing against wall: flexion, scaption x 10 each, attempted abduction increased pain    Rows x 20 red TB UBE L 1,  2 mins fwd, 1 min reverse  HP on R shoulder/ arm while doing UT stretches 2x 30 secs, LS stretches 2x 30 secs, cervical retractions x 10 with 5 secs   Manual therapy:  x 12 minutes, STM for R rotator cuff muscles, teres, pectoralis major/minor, biceps, anterior deltoids, Grade 1 oscillations, posterior and inferior directions, followed by PROME on R shoulder all planes x 10 reps  Attempted supine AROME flexion with 1# weight- unable to perform  AAROME with  1# dowel:  Flexion, abduction, chest press x 15, ER/IR with 1# dowel x   Sidelying shoulder abduction x 10    Wall slides flexion x 20, H abd with towel x 20  Standing against wall: flexion, scaption x 5  each, attempted using 1# DB increased pain and fatigue  Standing against wall: flexion, scaption, abduction x 10 each  Reach up to wall with 1# DB x 10  Rows x 20 red TB  Shoulder ext x 10 red TB   UBE L 1,  2 mins fwd, 1 min reverse  HP on R shoulder/ arm while doing UT stretches 2x 30 secs, LS stretches 2x 30 secs, cervical retractions x 10 with 5 secs   Manual therapy: x 8 mins, STM biceps,  anterior deltoids, Grade 1 oscillations, posterior and inferior directions, followed by PROME on R shoulder all planes x 10 reps  AROME  Supine: flexion x 10, protraction, alphabet x 10x 1 set  AROME Sidelying shoulder abduction x 10, ER x 10  Wall slides flexion x 20, H abd with towel x 20  Standing against wall: flexion, scaption, abduction 2x 5  each, using 1# DB increased pain and fatigue  Reach up to wall with 1# DB x 10  Wall push   Rows x 20  green tube   Shoulder ext x 20 red TB  Shoulder ER/IR with towel between arm and trunk x 20, green tube  Thera -ex 35 mins    HP on the R shoulder at end of session x 5 mins                        HEP: Access Code: [email or web address]  URL: [email or web address]  Date: 03/26/2024  Prepared by: Azra Chen    Exercises  - Seated Scapular Retraction  - 2 x daily - 7 x weekly - 1-2 sets - 10 reps  - Circular Shoulder Pendulum with Table Support  - 2 x daily - 7 x weekly - 1-2 sets - 10 reps  - Flexion-Extension Shoulder Pendulum with Table Support  - 2 x daily - 7 x weekly - 1-2 sets - 10 reps  - Horizontal Shoulder Pendulum with Table Support  - 2 x daily - 7 x weekly - 1-2 sets - 10 reps  - Seated Gentle Upper Trapezius Stretch  - 2 x daily - 7 x weekly - 1-2 sets - 3 reps - 20 hold  - Gentle Levator Scapulae Stretch  - 2 x daily - 7 x weekly - 1-2 sets - 3 reps - 20 hold  - Seated Chin Tuck with Neck Elongation  - 2 x daily - 7 x weekly - 1-2 sets - 3 reps - 20 hold  - Supine Shoulder Flexion with Dowel  - 2 x daily - 7 x weekly - 1-2 sets - 10 reps  - Supine Shoulder Abduction AAROM with Dowel  - 2 x daily - 7 x weekly - 1-2 sets - 10 reps  - Supine Shoulder Press with Dowel  - 2 x daily - 7 x weekly - 1-2 sets - 10 reps  - Shoulder Flexion Wall Slide with Towel  - 2 x daily - 7 x weekly - 1-2 sets - 10 reps    Charges: Thera -ex -2, Man therapy -1       Total Timed Treatment:  48 min  Total Treatment Time: 48 min

## 2024-04-12 ENCOUNTER — OFFICE VISIT (OUTPATIENT)
Dept: PHYSICAL THERAPY | Age: 79
End: 2024-04-12
Attending: PHYSICIAN ASSISTANT
Payer: MEDICARE

## 2024-04-12 PROCEDURE — 97110 THERAPEUTIC EXERCISES: CPT | Performed by: PHYSICAL THERAPIST

## 2024-04-12 NOTE — PROGRESS NOTES
Diagnosis:   Right shoulder pain (M25.511)  Impingement of right shoulder (M25.811)  Adhesive capsulitis of right shoulder (M75.02)      Referring Provider: Alyssa De Leon  Date of Evaluation:    3/22/2024    Precautions:  None Next MD visit:   none scheduled  Date of Surgery: n/a   Insurance Primary/Secondary: [employer] / N/A     # Auth Visits: n/a            Subjective: Patient reports that in the past 2 days, she had difficulty with lifting a full cup again, she was not happy about it, she felt that she had a setback. She has been good with doing her exercises including lifting a small bottle of water. When she moves her arm, and at rest the pain has been persistent.    Pain: 5/10 now      Objective: now no gait deviation noted, R arm less guarded.TTP on R AC joint, R biceps, pectoral muscles, rotator cuff muscles insertion on anterior shoulder. Noted pain with eccentric movement from flexion and abduction .     Assessment: Modified treatment to allow patient to recovery from exacerbation of symptoms, decreased weight for PREs. Discussed about impingement symptoms and importance of postural reeducation, keeping scapulas retracted and avoiding the rounded shoulders positioning. PT guided patient through treatment providing visual, verbal, and tactile cues for proper joint alignment/ body positioning on resisted exercises with green tube, rows, extensions, ER/IR, exercise tempo for desired treatment effect of exercises, and assessed response to interventions. Intervention adjusted to patient's tolerance throughout session duration. Discussed importance of each intervention to maximize treatment effect.      Goals:   Goals: (to be met in 8 visits)   Pt will have decreased pain by 30%-50%in the R shoulder , arm and neck in 4 visits to be able to tolerate raising the R arm and use for ADLs.  Pt will improve shoulder flexion AROM to >120 degrees to be able to reach into overhead cabinets without pain or  restriction   Pt will improve shoulder abduction AROM to >120 degrees to improve ability to don deodorant, don/doff shirts, and wash hair   Pt will increase shoulder AROM ER to 90 to be able to reach and fasten seatbelt   Pt will increase shoulder AROM IR to 70 to be able to reach in back pocket, tuck in shirt, and turn steering wheel without pain  Pt will improve shoulder strength throughout to 4/5 to improve function with UE dressing, self care, carrying, reaching, lifting, driving   Pt will demonstrate increased mid/low trap strength to 3/5 to promote improved shoulder mechanics and stabilization with lifting and reaching   Pt will be independent and compliant with comprehensive HEP to maintain progress achieved in PT.    Plan: Continue PT as per established POC. Continue MT and AROME, may add resistive exercises to periscapular strengthening  Date: 4/12/2024  Tx #: 7/8       UBE L 1,  2 mins fwd, 2 min reverse  HP on R shoulder/ arm while doing UT stretches 2x 30 secs, LS stretches 2x 30 secs, cervical retractions x 10 with 5 secs   Manual therapy: x 5 mins, STM biceps,  anterior deltoids, Grade 1 oscillations, posterior and inferior directions, followed by PROME on R shoulder all planes x 10 reps  AROME Supine: flexion x 15, protraction, alphabet x 15x 1 set  AROME Sidelying shoulder abduction x 10, ER x 15  Wall slides flexion x 20, H abd with towel x 20  Standing against wall: flexion, scaption, abduction 1x 5  each, using 1# DB increased pain   Reach up to wall with 1# DB 2x 5   Wall push x 10  Rows x 20  green tube   Shoulder ext x 20 green TB  Shoulder ER/IR with towel between arm and trunk x 20, green tube  Thera -ex 40 mins  CP on the R shoulder at end of session x 5 mins                            HEP: Access Code: [email or web address]  URL: [email or web address]  Date: 03/26/2024  Prepared by: Azra Chen    Exercises  - Seated Scapular Retraction  - 2 x daily - 7 x weekly - 1-2 sets - 10 reps  - Circular  Shoulder Pendulum with Table Support  - 2 x daily - 7 x weekly - 1-2 sets - 10 reps  - Flexion-Extension Shoulder Pendulum with Table Support  - 2 x daily - 7 x weekly - 1-2 sets - 10 reps  - Horizontal Shoulder Pendulum with Table Support  - 2 x daily - 7 x weekly - 1-2 sets - 10 reps  - Seated Gentle Upper Trapezius Stretch  - 2 x daily - 7 x weekly - 1-2 sets - 3 reps - 20 hold  - Gentle Levator Scapulae Stretch  - 2 x daily - 7 x weekly - 1-2 sets - 3 reps - 20 hold  - Seated Chin Tuck with Neck Elongation  - 2 x daily - 7 x weekly - 1-2 sets - 3 reps - 20 hold  - Supine Shoulder Flexion with Dowel  - 2 x daily - 7 x weekly - 1-2 sets - 10 reps  - Supine Shoulder Abduction AAROM with Dowel  - 2 x daily - 7 x weekly - 1-2 sets - 10 reps  - Supine Shoulder Press with Dowel  - 2 x daily - 7 x weekly - 1-2 sets - 10 reps  - Shoulder Flexion Wall Slide with Towel  - 2 x daily - 7 x weekly - 1-2 sets - 10 reps    Charges: Thera -ex -3       Total Timed Treatment:  50 min  Total Treatment Time: 50 min

## 2024-04-16 ENCOUNTER — OFFICE VISIT (OUTPATIENT)
Dept: PHYSICAL THERAPY | Age: 79
End: 2024-04-16
Attending: PHYSICIAN ASSISTANT
Payer: MEDICARE

## 2024-04-16 PROCEDURE — 97110 THERAPEUTIC EXERCISES: CPT | Performed by: PHYSICAL THERAPIST

## 2024-04-16 NOTE — PROGRESS NOTES
Diagnosis:   Right shoulder pain (M25.511)  Impingement of right shoulder (M25.811)  Adhesive capsulitis of right shoulder (M75.02)      Referring Provider: Alyssa De Leon  Date of Evaluation:    3/22/2024    Precautions:  None Next MD visit:   none scheduled  Date of Surgery: n/a   Insurance Primary/Secondary: [employer] / N/A     # Auth Visits: n/a           Progress Summary  Pt has attended 8 visits in Physical Therapy.    Subjective: Patient reports that she has been able to fill a cup and pour and take it from the microwave, but she also noticed that she had to limit the amount of activities that she does, when she does more activities, then she is not able to perform those tasks because of the pain. It is tender to touch in front of the R shoulder, and it clicks, the pain also occurs with certain activities.    Pain: 3/10 now, but can still increase to 8/10 with certain activities      Objective:   Observation/Posture: FHP, rounded shoulders, cristopher medial and inferior angle winging, symmetric shoulders  Palpation: Grade 1 tenderness on R anterior shoulder, over the greater tuberosity, R biceps  Sensation: Intact  Cervical Screen: UE DTRs - 2+  C AROM (deg) * pain  C flexion WNL, ext WNL, R lateral flexion- 45, L lateral flex 45, R rot - 60, L rot 60     AROM: (* denotes performed with pain)  Shoulder  Elbow   Flexion: supine- 150, R  sitting R - 145 ; L 135,   Abduction: R 145; L 135  ER: R 90; L 60  IR: R 60; L 50  Stabilized R arm at 90 deg  All motons with pain Flexion: R WNL; L WNL  Extension: R WNL; L WNL  Supination: R WNL, L WNL  Pronation: R WNL, L WNL   PROM(deg) - R shoulder flexion- 160, abduction- 135 , ER- 90, IR-70     Accessory motion: scapular elevation     Flexibility: Moderate tightness on R rotator cuff muscles, biceps, deltoids, pectoralis major muscles     Strength/MMT: (* denotes performed with pain)  Shoulder Elbow Scapular   Flexion: R 3/5 (supine); L 3+/5  Sitting R -  3+/5  Abduction: R 3+/5; L 4/5  ER: R 3/5; L 3+/5  IR: R 3/5; L 3+/5 Flexion: R 4/5; L 4/5  Extension: R 4/5; L 4/5  Supination: R 4/5; L 4/5  Pronation: R 4/5; L 4/5  Rhomboids: R 3/5, L 3/5  Mid trap: R 3/5; L 3/5   Lats: R 3/5, L 3/5  Low trap: R 3/5, R 3/5    R - 37 psi, L - 30 psi  Special tests:   Empty can - positive for pain  Full can - positive for pain  Neer Impingement - positive for pain         Assessment: Patient responded well to PT treatment, noted improvement of ROM on the R shoulder with improved ability to lift and raise arm. Patient also has improving shoulder and scapular strength. Patient has been able to perform exercises with minimal onset of pain, but still requires rest periods in between. Muscles weakness still limit patient's function. Patient is below their previous level of function and will benefit from skills and knowledge of PT to manage/ decrease symptoms and achieve established goals and return to PLOF.    Goals:   Goals: (to be met in 8 visits)   Pt will have decreased pain by 30%-50%in the R shoulder , arm and neck in 4 visits to be able to tolerate raising the R arm and use for ADLs.-MET 4/16; updated goal:Pt will have decreased pain by 90- 95%in the R shoulder , arm and neck to be able to tolerate raising the R arm and use for ADLs  Pt will improve shoulder flexion AROM to >120 degrees to be able to reach into overhead cabinets without pain or restriction - MET 4/16  Pt will improve shoulder abduction AROM to >120 degrees to improve ability to don deodorant, don/doff shirts, and wash hair - met 4/16  Pt will increase shoulder AROM ER to 90 to be able to reach and fasten seatbelt - MET 4/16  Pt will increase shoulder AROM IR to 70 to be able to reach in back pocket, tuck in shirt, and turn steering wheel without pain- MET 4/16  Pt will improve shoulder strength throughout to 4/5 to improve function with UE dressing, self care, carrying, reaching, lifting, driving -  PROGRESSING  Pt will demonstrate increased mid/low trap strength to 3/5 to promote improved shoulder mechanics and stabilization with lifting and reaching - MET 4/16  Pt will be independent and compliant with comprehensive HEP to maintain progress achieved in PT.- PROGRESSING    Plan: Continue PT as per updated POC. add resistive exercises to  shoulder and periscapular strengthening  Date: 4/12/2024  Tx #: 7/8 Date: 4/16/2024  Tx #: 8/8      UBE L 1,  2 mins fwd, 2 min reverse  HP on R shoulder/ arm while doing UT stretches 2x 30 secs, LS stretches 2x 30 secs, cervical retractions x 10 with 5 secs   Manual therapy: x 5 mins, STM biceps,  anterior deltoids, Grade 1 oscillations, posterior and inferior directions, followed by PROME on R shoulder all planes x 10 reps  AROME Supine: flexion x 15, protraction, alphabet x 15x 1 set  AROME Sidelying shoulder abduction x 10, ER x 15  Wall slides flexion x 20, H abd with towel x 20  Standing against wall: flexion, scaption, abduction 1x 5  each, using 1# DB increased pain   Reach up to wall with 1# DB 2x 5   Wall push x 10  Rows x 20  green tube   Shoulder ext x 20 green TB  Shoulder ER/IR with towel between arm and trunk x 20, green tube  Thera -ex 40 mins  CP on the R shoulder at end of session x 5 mins UBE L 1,  2 mins fwd, 2 min reverse  Doorway stretches,  UT stretches 2x 30 secs, LS stretches 2x 30 secs, cervical retractions x 10 with 5 secs   Scapular retractions x 10   AROME Supine: flexion x 10, protraction, x 10  AROME Sidelying shoulder abduction x 10, ER x 10  Wall slides flexion x 20, H abd with towel x 20  Standing against wall: flexion, scaption, abduction 2x 5  each, using 1# DB increased pain   Reach up to wall with 1# DB 2x 5   Wall push x 10  Rows x 2x10  green tube   Shoulder ext x 2x10 green TB  Shoulder ER/IR with towel between arm and trunk x 20, green tube  Thera -ex 40 mins  CP on the R shoulder at end of session x 5 mins                            HEP: Access Code: [email or web address]  URL: [email or web address]  Date: 03/26/2024  Prepared by: Azra Chen    Exercises  - Seated Scapular Retraction  - 2 x daily - 7 x weekly - 1-2 sets - 10 reps  - Circular Shoulder Pendulum with Table Support  - 2 x daily - 7 x weekly - 1-2 sets - 10 reps  - Flexion-Extension Shoulder Pendulum with Table Support  - 2 x daily - 7 x weekly - 1-2 sets - 10 reps  - Horizontal Shoulder Pendulum with Table Support  - 2 x daily - 7 x weekly - 1-2 sets - 10 reps  - Seated Gentle Upper Trapezius Stretch  - 2 x daily - 7 x weekly - 1-2 sets - 3 reps - 20 hold  - Gentle Levator Scapulae Stretch  - 2 x daily - 7 x weekly - 1-2 sets - 3 reps - 20 hold  - Seated Chin Tuck with Neck Elongation  - 2 x daily - 7 x weekly - 1-2 sets - 3 reps - 20 hold  - Supine Shoulder Flexion with Dowel  - 2 x daily - 7 x weekly - 1-2 sets - 10 reps  - Supine Shoulder Abduction AAROM with Dowel  - 2 x daily - 7 x weekly - 1-2 sets - 10 reps  - Supine Shoulder Press with Dowel  - 2 x daily - 7 x weekly - 1-2 sets - 10 reps  - Shoulder Flexion Wall Slide with Towel  - 2 x daily - 7 x weekly - 1-2 sets - 10 reps  Post QuickDASH Outcome Score  Post Score: 6.82 % (4/16/2024  1:14 PM)    25 % improvement    Plan: Continue skilled Physical Therapy 1 x/week or a total of 4 visits over a 90 day period. Treatment will include: Therapeutic exercises, Therapeutic activities, NMR, Manual therapy, HEP education.    Patient/Family/Caregiver was advised of these findings, precautions, and treatment options and has agreed to actively participate in planning and for this course of care.    Thank you for your referral. If you have any questions, please contact me at Dept: [phone].    Sincerely,  Electronically signed by therapist: Azra Chen PT     Physician's certification required:  No  Please co-sign or sign and return this letter via fax as soon as possible to [phone].   I certify the need for these services  furnished under this plan of treatment and while under my care.    X___________________________________________________ Date____________________    Certification From: 4/16/2024  To:7/15/2024      Charges: Thera -shruti -3       Total Timed Treatment:  40 min  Total Treatment Time: 45 min

## 2024-04-18 ENCOUNTER — APPOINTMENT (OUTPATIENT)
Dept: PHYSICAL THERAPY | Age: 79
End: 2024-04-18
Attending: PHYSICIAN ASSISTANT
Payer: MEDICARE

## 2024-04-19 ENCOUNTER — OFFICE VISIT (OUTPATIENT)
Dept: PHYSICAL THERAPY | Age: 79
End: 2024-04-19
Attending: PHYSICIAN ASSISTANT
Payer: MEDICARE

## 2024-04-19 PROCEDURE — 97110 THERAPEUTIC EXERCISES: CPT | Performed by: PHYSICAL THERAPIST

## 2024-04-19 NOTE — PROGRESS NOTES
Diagnosis:   Right shoulder pain (M25.511)  Impingement of right shoulder (M25.811)  Adhesive capsulitis of right shoulder (M75.02)      Referring Provider: Alyssa De Leon  Date of Evaluation:    3/22/2024    Precautions:  None Next MD visit:   none scheduled  Date of Surgery: n/a   Insurance Primary/Secondary: [employer] / N/A     # Auth Visits: n/a            Subjective: Patient reports that she is able to lift a full cup on the microwave and pour, she was also able to pour. She also cleaned the shower door after having a shower. It just feels sore right now.She was able to do exercises with the water bottle with no onset of pain, but she only did it 5x.    Pain:  3/10       Objective:   Observation/Posture: FHP, rounded shoulders, cristopher medial and inferior angle winging, symmetric shoulders  Palpation: Grade 1 tenderness on R anterior shoulder, over the greater tuberosity, R biceps  Sensation: Intact  Cervical Screen: UE DTRs - 2+  C AROM (deg) * pain  C flexion WNL, ext WNL, R lateral flexion- 45, L lateral flex 45, R rot - 60, L rot 60     AROM: (* denotes performed with pain)  Shoulder  Elbow   Flexion: supine- 150, R  sitting R - 145 ; L 135,   Abduction: R 145; L 135  ER: R 90; L 60  IR: R 60; L 50  Stabilized R arm at 90 deg  All motons with pain Flexion: R WNL; L WNL  Extension: R WNL; L WNL  Supination: R WNL, L WNL  Pronation: R WNL, L WNL   PROM(deg) - R shoulder flexion- 160, abduction- 135 , ER- 90, IR-70     Accessory motion: scapular elevation     Flexibility: Moderate tightness on R rotator cuff muscles, biceps, deltoids, pectoralis major muscles     Strength/MMT: (* denotes performed with pain)  Shoulder Elbow Scapular   Flexion: R 3/5 (supine); L 3+/5  Sitting R - 3+/5  Abduction: R 3+/5; L 4/5  ER: R 3/5; L 3+/5  IR: R 3/5; L 3+/5 Flexion: R 4/5; L 4/5  Extension: R 4/5; L 4/5  Supination: R 4/5; L 4/5  Pronation: R 4/5; L 4/5  Rhomboids: R 3/5, L 3/5  Mid trap: R 3/5; L 3/5   Lats:  R 3/5, L 3/5  Low trap: R 3/5, R 3/5    R - 37 psi, L - 30 psi  Special tests:   Empty can - positive for pain  Full can - positive for pain  Neer Impingement - positive for pain         Assessment: Patient continues to progress well with PT, had patient push an occupied wheelchair since 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 Patient was able to tolerate without increase in pain.    Goals:   Goals: (to be met in 8 visits)   Pt will have decreased pain by 30%-50%in the R shoulder , arm and neck in 4 visits to be able to tolerate raising the R arm and use for ADLs.-MET 4/16; updated goal:Pt will have decreased pain by 90- 95%in the R shoulder , arm and neck to be able to tolerate raising the R arm and use for ADLs  Pt will improve shoulder flexion AROM to >120 degrees to be able to reach into overhead cabinets without pain or restriction - MET 4/16  Pt will improve shoulder abduction AROM to >120 degrees to improve ability to don deodorant, don/doff shirts, and wash hair - met 4/16  Pt will increase shoulder AROM ER to 90 to be able to reach and fasten seatbelt - MET 4/16  Pt will increase shoulder AROM IR to 70 to be able to reach in back pocket, tuck in shirt, and turn steering wheel without pain- MET 4/16  Pt will improve shoulder strength throughout to 4/5 to improve function with UE dressing, self care, carrying, reaching, lifting, driving - PROGRESSING  Pt will demonstrate increased mid/low trap strength to 3/5 to promote improved shoulder mechanics and stabilization with lifting and reaching - MET 4/16  Pt will be independent and compliant with comprehensive HEP to maintain progress achieved in PT.- PROGRESSING    Plan: Continue PT as per updated POC. add resistive exercises to  shoulder and periscapular strengthening  Date: 4/12/2024  Tx #: 7/8 Date: 4/16/2024  Tx #: 8/8 Date: 4/19/2024  Tx #: 9/12     UBE L 1,  2 mins fwd, 2 min reverse  HP on R  shoulder/ arm while doing UT stretches 2x 30 secs, LS stretches 2x 30 secs, cervical retractions x 10 with 5 secs   Manual therapy: x 5 mins, STM biceps,  anterior deltoids, Grade 1 oscillations, posterior and inferior directions, followed by PROME on R shoulder all planes x 10 reps  AROME Supine: flexion x 15, protraction, alphabet x 15x 1 set  AROME Sidelying shoulder abduction x 10, ER x 15  Wall slides flexion x 20, H abd with towel x 20  Standing against wall: flexion, scaption, abduction 1x 5  each, using 1# DB increased pain   Reach up to wall with 1# DB 2x 5   Wall push x 10  Rows x 20  green tube   Shoulder ext x 20 green TB  Shoulder ER/IR with towel between arm and trunk x 20, green tube  Thera -ex 40 mins  CP on the R shoulder at end of session x 5 mins UBE L 1,  2 mins fwd, 2 min reverse  Doorway stretches,  UT stretches 2x 30 secs, LS stretches 2x 30 secs, cervical retractions x 10 with 5 secs   Scapular retractions x 10   AROME Supine: flexion x 10, protraction, x 10  AROME Sidelying shoulder abduction x 10, ER x 10  Wall slides flexion x 20, H abd with towel x 20  Standing against wall: flexion, scaption, abduction 2x 5  each, using 1# DB increased pain   Reach up to wall with 1# DB 2x 5   Wall push x 10  Rows x 2x10  green tube   Shoulder ext x 2x10 green TB  Shoulder ER/IR with towel between arm and trunk x 20, green tube  Thera -ex 40 mins  CP on the R shoulder at end of session x 5 mins UBE L 1,  2 mins fwd, 2 min reverse  Doorway stretches,  UT stretches 2x 30 secs, LS stretches 2x 30 secs, cervical retractions x 10 with 5 secs   Scapular retractions x 10   AROME Supine: flexion x 10, protraction, x 10  AROME Sidelying shoulder abduction x 10, ER x 10  Wall slides flexion x 20, H abd with towel x 20  Standing against wall: flexion, scaption, abduction 2x 5  each, using 1# DB increased pain   Reach up to wall with 1# DB 1x10  Biceps with eccentric contractions x 10 1# DB  Push occupied wheelchair   x 2 rounds inside clinic   Wall push x 10  Rows x 2x10  green tube   Shoulder ext x 2x10 green TB  Shoulder ER/IR with towel between arm and trunk x 20, green tube  Chest press green tube x 20  Horizontal pulls with YTB x 10  Thera -ex 40 mins  CP on the R shoulder at end of session x 5 mins                              HEP: Access Code: [email or web address]  URL: [email or web address]  Date: 03/26/2024  Prepared by: Azra Chen    Exercises  - Seated Scapular Retraction  - 2 x daily - 7 x weekly - 1-2 sets - 10 reps  - Circular Shoulder Pendulum with Table Support  - 2 x daily - 7 x weekly - 1-2 sets - 10 reps  - Flexion-Extension Shoulder Pendulum with Table Support  - 2 x daily - 7 x weekly - 1-2 sets - 10 reps  - Horizontal Shoulder Pendulum with Table Support  - 2 x daily - 7 x weekly - 1-2 sets - 10 reps  - Seated Gentle Upper Trapezius Stretch  - 2 x daily - 7 x weekly - 1-2 sets - 3 reps - 20 hold  - Gentle Levator Scapulae Stretch  - 2 x daily - 7 x weekly - 1-2 sets - 3 reps - 20 hold  - Seated Chin Tuck with Neck Elongation  - 2 x daily - 7 x weekly - 1-2 sets - 3 reps - 20 hold  - Supine Shoulder Flexion with Dowel  - 2 x daily - 7 x weekly - 1-2 sets - 10 reps  - Supine Shoulder Abduction AAROM with Dowel  - 2 x daily - 7 x weekly - 1-2 sets - 10 reps  - Supine Shoulder Press with Dowel  - 2 x daily - 7 x weekly - 1-2 sets - 10 reps  - Shoulder Flexion Wall Slide with Towel  - 2 x daily - 7 x weekly - 1-2 sets - 10 reps    Charges: Thera -ex -3       Total Timed Treatment:  40 min  Total Treatment Time: 45 min

## 2024-04-23 ENCOUNTER — OFFICE VISIT (OUTPATIENT)
Dept: PHYSICAL THERAPY | Age: 79
End: 2024-04-23
Attending: PHYSICIAN ASSISTANT
Payer: MEDICARE

## 2024-04-23 PROCEDURE — 97110 THERAPEUTIC EXERCISES: CPT | Performed by: PHYSICAL THERAPIST

## 2024-04-23 NOTE — PROGRESS NOTES
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 It does not hurt all the time, when it hurts is 4/10.  Pain:  0/10       Objective:   Observation/Posture: FHP, rounded shoulders, cristopher medial and inferior angle winging, symmetric shoulders  Palpation: Grade 1 tenderness on R anterior shoulder, over the greater tuberosity, R biceps  Sensation: Intact  Cervical Screen: UE DTRs - 2+  C AROM (deg) * pain  C flexion WNL, ext WNL, R lateral flexion- 45, L lateral flex 45, R rot - 60, L rot 60     AROM: (* denotes performed with pain)  Shoulder  Elbow   Flexion: supine- 150, R  sitting R - 145 ; L 135,   Abduction: R 145; L 135  ER: R 90; L 60  IR: R 60; L 50  Stabilized R arm at 90 deg  All motons with pain Flexion: R WNL; L WNL  Extension: R WNL; L WNL  Supination: R WNL, L WNL  Pronation: R WNL, L WNL   PROM(deg) - R shoulder flexion- 160, abduction- 135 , ER- 90, IR-70     Accessory motion: scapular elevation     Flexibility: Moderate tightness on R rotator cuff muscles, biceps, deltoids, pectoralis major muscles     Strength/MMT: (* denotes performed with pain)  Shoulder Elbow Scapular   Flexion: R 3/5 (supine); L 3+/5  Sitting R - 3+/5  Abduction: R 3+/5; L 4/5  ER: R 3/5; L 3+/5  IR: R 3/5; L 3+/5 Flexion: R 4/5; L 4/5  Extension: R 4/5; L 4/5  Supination: R 4/5; L 4/5  Pronation: R 4/5; L 4/5  Rhomboids: R 3/5, L 3/5  Mid trap: R 3/5; L 3/5   Lats: R 3/5, L 3/5  Low trap: R 3/5, R 3/5    R - 37 psi, L - 30 psi  Special tests:    Empty can - positive for pain  Full can - positive for pain  Neer Impingement - positive for pain         Assessment: Patient continues to progress well with PT, patient presents with symptoms consistent with impingement on the R shoulder, R shoulder IR, H abduction with YTB and elbow flexion reproduced mild pain, but patient was able to finish exercises.   Goals:   Goals: (to be met in 8 visits)   Pt will have decreased pain by 30%-50%in the R shoulder , arm and neck in 4 visits to be able to tolerate raising the R arm and use for ADLs.-MET 4/16; updated goal:Pt will have decreased pain by 90- 95%in the R shoulder , arm and neck to be able to tolerate raising the R arm and use for ADLs  Pt will improve shoulder flexion AROM to >120 degrees to be able to reach into overhead cabinets without pain or restriction - MET 4/16  Pt will improve shoulder abduction AROM to >120 degrees to improve ability to don deodorant, don/doff shirts, and wash hair - met 4/16  Pt will increase shoulder AROM ER to 90 to be able to reach and fasten seatbelt - MET 4/16  Pt will increase shoulder AROM IR to 70 to be able to reach in back pocket, tuck in shirt, and turn steering wheel without pain- MET 4/16  Pt will improve shoulder strength throughout to 4/5 to improve function with UE dressing, self care, carrying, reaching, lifting, driving - PROGRESSING  Pt will demonstrate increased mid/low trap strength to 3/5 to promote improved shoulder mechanics and stabilization with lifting and reaching - MET 4/16  Pt will be independent and compliant with comprehensive HEP to maintain progress achieved in PT.- PROGRESSING    Plan: Continue PT as per updated POC. add resistive exercises to  shoulder and periscapular strengthening  Date: 4/12/2024  Tx #: 7/8 Date: 4/16/2024  Tx #: 8/8 Date: 4/19/2024  Tx #: 9/12 Date: 4/23/2024   Tx #: 10/12    UBE L 1,  2 mins fwd, 2 min reverse  HP on R shoulder/ arm while doing UT stretches 2x 30 secs, LS  stretches 2x 30 secs, cervical retractions x 10 with 5 secs   Manual therapy: x 5 mins, STM biceps,  anterior deltoids, Grade 1 oscillations, posterior and inferior directions, followed by PROME on R shoulder all planes x 10 reps  AROME Supine: flexion x 15, protraction, alphabet x 15x 1 set  AROME Sidelying shoulder abduction x 10, ER x 15  Wall slides flexion x 20, H abd with towel x 20  Standing against wall: flexion, scaption, abduction 1x 5  each, using 1# DB increased pain   Reach up to wall with 1# DB 2x 5   Wall push x 10  Rows x 20  green tube   Shoulder ext x 20 green TB  Shoulder ER/IR with towel between arm and trunk x 20, green tube  Thera -ex 40 mins  CP on the R shoulder at end of session x 5 mins UBE L 1,  2 mins fwd, 2 min reverse  Doorway stretches,  UT stretches 2x 30 secs, LS stretches 2x 30 secs, cervical retractions x 10 with 5 secs   Scapular retractions x 10   AROME Supine: flexion x 10, protraction, x 10  AROME Sidelying shoulder abduction x 10, ER x 10  Wall slides flexion x 20, H abd with towel x 20  Standing against wall: flexion, scaption, abduction 2x 5  each, using 1# DB increased pain   Reach up to wall with 1# DB 2x 5   Wall push x 10  Rows x 2x10  green tube   Shoulder ext x 2x10 green TB  Shoulder ER/IR with towel between arm and trunk x 20, green tube  Thera -ex 40 mins  CP on the R shoulder at end of session x 5 mins UBE L 1,  2 mins fwd, 2 min reverse  Doorway stretches,  UT stretches 2x 30 secs, LS stretches 2x 30 secs, cervical retractions x 10 with 5 secs   Scapular retractions x 10   AROME Supine: flexion x 10, protraction, x 10  AROME Sidelying shoulder abduction x 10, ER x 10  Wall slides flexion x 20, H abd with towel x 20  Standing against wall: flexion, scaption, abduction 2x 5  each, using 1# DB increased pain   Reach up to wall with 1# DB 1x10  Biceps with eccentric contractions x 10 1# DB  Push occupied wheelchair  x 2 rounds inside clinic   Wall push x 10  Rows x  2x10  green tube   Shoulder ext x 2x10 green TB  Shoulder ER/IR with towel between arm and trunk x 20, green tube  Chest press green tube x 20  Horizontal pulls with YTB x 10  Thera -ex 40 mins  CP on the R shoulder at end of session x 5 mins     UBE L 1,  2 mins fwd, 2 min reverse  Doorway stretches,  UT stretches 2x 30 secs, LS stretches 2x 30 secs, cervical retractions x 10 with 5 secs   Scapular retractions x 10   AROME Supine: flexion x 10, protraction, x 10  AROME Sidelying shoulder abduction x 20, ER x 20  Wall slides flexion x 20, H abd with towel x 20  Standing against wall: flexion, scaption, abduction 1x 8, 1,2  each, using 1# DB increased pain   Reach up to wall with 1# DB 1x10  Biceps with eccentric contractions x 10 1# DB  Wall push 2x 10  Rows x 2x10  green tube   Shoulder ext x 2x10 green TB  Shoulder ER/IR with towel between arm and trunk x 20, green tube  Chest press green tube x 20  Horizontal pulls with YTB x 10  Thera -ex 40 mins  CP on the R shoulder at end of session x 5 mins                         HEP: Access Code: [email or web address]  URL: [email or web address]  Date: 03/26/2024  Prepared by: Azra Chen    Exercises  - Seated Scapular Retraction  - 2 x daily - 7 x weekly - 1-2 sets - 10 reps  - Circular Shoulder Pendulum with Table Support  - 2 x daily - 7 x weekly - 1-2 sets - 10 reps  - Flexion-Extension Shoulder Pendulum with Table Support  - 2 x daily - 7 x weekly - 1-2 sets - 10 reps  - Horizontal Shoulder Pendulum with Table Support  - 2 x daily - 7 x weekly - 1-2 sets - 10 reps  - Seated Gentle Upper Trapezius Stretch  - 2 x daily - 7 x weekly - 1-2 sets - 3 reps - 20 hold  - Gentle Levator Scapulae Stretch  - 2 x daily - 7 x weekly - 1-2 sets - 3 reps - 20 hold  - Seated Chin Tuck with Neck Elongation  - 2 x daily - 7 x weekly - 1-2 sets - 3 reps - 20 hold  - Supine Shoulder Flexion with Dowel  - 2 x daily - 7 x weekly - 1-2 sets - 10 reps  - Supine Shoulder Abduction AAROM with  Dowel  - 2 x daily - 7 x weekly - 1-2 sets - 10 reps  - Supine Shoulder Press with Dowel  - 2 x daily - 7 x weekly - 1-2 sets - 10 reps  - Shoulder Flexion Wall Slide with Towel  - 2 x daily - 7 x weekly - 1-2 sets - 10 reps    Charges: Thera -ex -3       Total Timed Treatment:  40 min  Total Treatment Time: 45 min

## 2024-04-26 ENCOUNTER — TELEPHONE (OUTPATIENT)
Dept: PHYSICAL THERAPY | Facility: HOSPITAL | Age: 79
End: 2024-04-26

## 2024-04-29 ENCOUNTER — OFFICE VISIT (OUTPATIENT)
Dept: PHYSICAL THERAPY | Age: 79
End: 2024-04-29
Attending: PHYSICIAN ASSISTANT
Payer: MEDICARE

## 2024-04-29 PROCEDURE — 97140 MANUAL THERAPY 1/> REGIONS: CPT | Performed by: PHYSICAL THERAPIST

## 2024-04-29 PROCEDURE — 97110 THERAPEUTIC EXERCISES: CPT | Performed by: PHYSICAL THERAPIST

## 2024-04-29 NOTE — PROGRESS NOTES
Diagnosis:   Right shoulder pain (M25.511)  Impingement of right shoulder (M25.811)  Adhesive capsulitis of right shoulder (M75.02)      Referring Provider: Alyssa De Leon  Date of Evaluation:    3/22/2024    Precautions:  None Next MD visit:   none scheduled  Date of Surgery: n/a   Insurance Primary/Secondary: [employer] / N/A     # Auth Visits: n/a            Subjective: Patient reports that her R shoulder has been sore the whole week, last Friday, it started bothering her a lot, but she is still able to lift and pour her cup in the mornings, and she was also able to change her bed linens.   Pain:  6-7/10       Objective:   Palpation: Grade 1 tenderness on R anterior shoulder, over the greater tuberosity, R biceps. Tightness on R biceps, pectoralis minor.    Assessment: Patient is having exacerbation of symptoms, modified treatment and performed exercises to tolerance, stopping when pain increases. Discussed with patient importance of postural awareness. Performed manula therapy, STM to decrease muscles tightness and pain.      Goals:   Goals: (to be met in 8 visits)   Pt will have decreased pain by 30%-50%in the R shoulder , arm and neck in 4 visits to be able to tolerate raising the R arm and use for ADLs.-MET 4/16; updated goal:Pt will have decreased pain by 90- 95%in the R shoulder , arm and neck to be able to tolerate raising the R arm and use for ADLs  Pt will improve shoulder flexion AROM to >120 degrees to be able to reach into overhead cabinets without pain or restriction - MET 4/16  Pt will improve shoulder abduction AROM to >120 degrees to improve ability to don deodorant, don/doff shirts, and wash hair - met 4/16  Pt will increase shoulder AROM ER to 90 to be able to reach and fasten seatbelt - MET 4/16  Pt will increase shoulder AROM IR to 70 to be able to reach in back pocket, tuck in shirt, and turn steering wheel without pain- MET 4/16  Pt will improve shoulder strength throughout to  4/5 to improve function with UE dressing, self care, carrying, reaching, lifting, driving - PROGRESSING  Pt will demonstrate increased mid/low trap strength to 3/5 to promote improved shoulder mechanics and stabilization with lifting and reaching - MET 4/16  Pt will be independent and compliant with comprehensive HEP to maintain progress achieved in PT.- PROGRESSING    Plan: Continue PT as per updated POC. add resistive exercises to  shoulder and periscapular strengthening  Date: 4/12/2024  Tx #: 7/8 Date: 4/16/2024  Tx #: 8/8 Date: 4/19/2024  Tx #: 9/12 Date: 4/23/2024   Tx #: 10/12 Date: 4/29/2024   Tx #: 11/12   UBE L 1,  2 mins fwd, 2 min reverse  HP on R shoulder/ arm while doing UT stretches 2x 30 secs, LS stretches 2x 30 secs, cervical retractions x 10 with 5 secs   Manual therapy: x 5 mins, STM biceps,  anterior deltoids, Grade 1 oscillations, posterior and inferior directions, followed by PROME on R shoulder all planes x 10 reps  AROME Supine: flexion x 15, protraction, alphabet x 15x 1 set  AROME Sidelying shoulder abduction x 10, ER x 15  Wall slides flexion x 20, H abd with towel x 20  Standing against wall: flexion, scaption, abduction 1x 5  each, using 1# DB increased pain   Reach up to wall with 1# DB 2x 5   Wall push x 10  Rows x 20  green tube   Shoulder ext x 20 green TB  Shoulder ER/IR with towel between arm and trunk x 20, green tube  Thera -ex 40 mins  CP on the R shoulder at end of session x 5 mins UBE L 1,  2 mins fwd, 2 min reverse  Doorway stretches,  UT stretches 2x 30 secs, LS stretches 2x 30 secs, cervical retractions x 10 with 5 secs   Scapular retractions x 10   AROME Supine: flexion x 10, protraction, x 10  AROME Sidelying shoulder abduction x 10, ER x 10  Wall slides flexion x 20, H abd with towel x 20  Standing against wall: flexion, scaption, abduction 2x 5  each, using 1# DB increased pain   Reach up to wall with 1# DB 2x 5   Wall push x 10  Rows x 2x10  green tube   Shoulder ext x  2x10 green TB  Shoulder ER/IR with towel between arm and trunk x 20, green tube  Thera -ex 40 mins  CP on the R shoulder at end of session x 5 mins UBE L 1,  2 mins fwd, 2 min reverse  Doorway stretches,  UT stretches 2x 30 secs, LS stretches 2x 30 secs, cervical retractions x 10 with 5 secs   Scapular retractions x 10   AROME Supine: flexion x 10, protraction, x 10  AROME Sidelying shoulder abduction x 10, ER x 10  Wall slides flexion x 20, H abd with towel x 20  Standing against wall: flexion, scaption, abduction 2x 5  each, using 1# DB increased pain   Reach up to wall with 1# DB 1x10  Biceps with eccentric contractions x 10 1# DB  Push occupied wheelchair  x 2 rounds inside clinic   Wall push x 10  Rows x 2x10  green tube   Shoulder ext x 2x10 green TB  Shoulder ER/IR with towel between arm and trunk x 20, green tube  Chest press green tube x 20  Horizontal pulls with YTB x 10  Thera -ex 40 mins  CP on the R shoulder at end of session x 5 mins     UBE L 1,  2 mins fwd, 2 min reverse  Doorway stretches,  UT stretches 2x 30 secs, LS stretches 2x 30 secs, cervical retractions x 10 with 5 secs   Scapular retractions x 10   AROME Supine: flexion x 10, protraction, x 10  AROME Sidelying shoulder abduction x 20, ER x 20  Wall slides flexion x 20, H abd with towel x 20  Standing against wall: flexion, scaption, abduction 1x 8, 1,2  each, using 1# DB increased pain   Reach up to wall with 1# DB 1x10  Biceps with eccentric contractions x 10 1# DB  Wall push 2x 10  Rows x 2x10  green tube   Shoulder ext x 2x10 green TB  Shoulder ER/IR with towel between arm and trunk x 20, green tube  Chest press green tube x 20  Horizontal pulls with YTB x 10  Thera -ex 40 mins  CP on the R shoulder at end of session x 5 mins UBE L 1,  2 mins fwd, 2 min reverse  Doorway stretches,  UT stretches 2x 30 secs, LS stretches 2x 30 secs, cervical retractions x 10 with 5 secs   AROME Supine: flexion x 10, alphabet A-P only (increased pain  STM  for R anterior shoulder x 8 mins  AROME Sidelying shoulder abduction x 10, ER x 10  Wall slides flexion x 10, H abd with towel x 10  Standing against wall: flexion, scaption, abduction 1x 2, using 1# DB increased pain , finished 1x 8 without DB  Reach up to wall with 1# DB 1x8  Wall push 2x 10  Rows x 2x10  green tube   Shoulder ext x 2x10 green TB  Shoulder ER/IR with towel between arm and trunk x 10, green tube  Thera -ex 30 mins  CP on the R shoulder at end of session x 5 mins                        HEP: Access Code: [email or web address]  URL: [email or web address]  Date: 03/26/2024  Prepared by: Azra Chen    Exercises  - Seated Scapular Retraction  - 2 x daily - 7 x weekly - 1-2 sets - 10 reps  - Circular Shoulder Pendulum with Table Support  - 2 x daily - 7 x weekly - 1-2 sets - 10 reps  - Flexion-Extension Shoulder Pendulum with Table Support  - 2 x daily - 7 x weekly - 1-2 sets - 10 reps  - Horizontal Shoulder Pendulum with Table Support  - 2 x daily - 7 x weekly - 1-2 sets - 10 reps  - Seated Gentle Upper Trapezius Stretch  - 2 x daily - 7 x weekly - 1-2 sets - 3 reps - 20 hold  - Gentle Levator Scapulae Stretch  - 2 x daily - 7 x weekly - 1-2 sets - 3 reps - 20 hold  - Seated Chin Tuck with Neck Elongation  - 2 x daily - 7 x weekly - 1-2 sets - 3 reps - 20 hold  - Supine Shoulder Flexion with Dowel  - 2 x daily - 7 x weekly - 1-2 sets - 10 reps  - Supine Shoulder Abduction AAROM with Dowel  - 2 x daily - 7 x weekly - 1-2 sets - 10 reps  - Supine Shoulder Press with Dowel  - 2 x daily - 7 x weekly - 1-2 sets - 10 reps  - Shoulder Flexion Wall Slide with Towel  - 2 x daily - 7 x weekly - 1-2 sets - 10 reps    Charges: Thera -ex -2, Man therapy - 1    Total Timed Treatment:  38 min  Total Treatment Time: 43 min

## 2024-05-03 ENCOUNTER — OFFICE VISIT (OUTPATIENT)
Dept: PHYSICAL THERAPY | Age: 79
End: 2024-05-03
Attending: PHYSICIAN ASSISTANT
Payer: MEDICARE

## 2024-05-03 ENCOUNTER — TELEPHONE (OUTPATIENT)
Dept: PHYSICAL THERAPY | Facility: HOSPITAL | Age: 79
End: 2024-05-03

## 2024-05-03 NOTE — PROGRESS NOTES
Diagnosis:   Right shoulder pain (M25.511)  Impingement of right shoulder (M25.811)  Adhesive capsulitis of right shoulder (M75.02)      Referring Provider: Alyssa De Leon  Date of Evaluation:    3/22/2024    Precautions:  None Next MD visit:   none scheduled  Date of Surgery: n/a   Insurance Primary/Secondary: [employer] MEDICARE / N/A     # Auth Visits: n/a          Patient came in for PT but when she was about to start warm up on UBE, PT noticed blood shot R eye, and patient mentioned she has pain on the R eye, feels tender and felt a little headache. PT decided not to do PT treatment today, and had patient call eye MD for check up.    Subjective: Still sore on the R shoulder.    Pain:  6-7/10       Objective:   Palpation: Grade 1 tenderness on R anterior shoulder, over the greater tuberosity, R biceps. Tightness on R biceps, pectoralis minor.    Assessment: No treatment done today since patient is having symptoms on the R eye. BP checked, 130/80mmHg.    Goals:   Goals: (to be met in 8 visits)   Pt will have decreased pain by 30%-50%in the R shoulder , arm and neck in 4 visits to be able to tolerate raising the R arm and use for ADLs.-MET 4/16; updated goal:Pt will have decreased pain by 90- 95%in the R shoulder , arm and neck to be able to tolerate raising the R arm and use for ADLs  Pt will improve shoulder flexion AROM to >120 degrees to be able to reach into overhead cabinets without pain or restriction - MET 4/16  Pt will improve shoulder abduction AROM to >120 degrees to improve ability to don deodorant, don/doff shirts, and wash hair - met 4/16  Pt will increase shoulder AROM ER to 90 to be able to reach and fasten seatbelt - MET 4/16  Pt will increase shoulder AROM IR to 70 to be able to reach in back pocket, tuck in shirt, and turn steering wheel without pain- MET 4/16  Pt will improve shoulder strength throughout to 4/5 to improve function with UE dressing, self care, carrying, reaching, lifting,  driving - PROGRESSING  Pt will demonstrate increased mid/low trap strength to 3/5 to promote improved shoulder mechanics and stabilization with lifting and reaching - MET 4/16  Pt will be independent and compliant with comprehensive HEP to maintain progress achieved in PT.- PROGRESSING    Plan: Continue PT as per updated POC. add resistive exercises to  shoulder and periscapular strengthening  Date: 4/12/2024  Tx #: 7/8 Date: 4/16/2024  Tx #: 8/8 Date: 4/19/2024  Tx #: 9/12 Date: 4/23/2024   Tx #: 10/12 Date: 4/29/2024   Tx #: 11/12   UBE L 1,  2 mins fwd, 2 min reverse  HP on R shoulder/ arm while doing UT stretches 2x 30 secs, LS stretches 2x 30 secs, cervical retractions x 10 with 5 secs   Manual therapy: x 5 mins, STM biceps,  anterior deltoids, Grade 1 oscillations, posterior and inferior directions, followed by PROME on R shoulder all planes x 10 reps  AROME Supine: flexion x 15, protraction, alphabet x 15x 1 set  AROME Sidelying shoulder abduction x 10, ER x 15  Wall slides flexion x 20, H abd with towel x 20  Standing against wall: flexion, scaption, abduction 1x 5  each, using 1# DB increased pain   Reach up to wall with 1# DB 2x 5   Wall push x 10  Rows x 20  green tube   Shoulder ext x 20 green TB  Shoulder ER/IR with towel between arm and trunk x 20, green tube  Thera -ex 40 mins  CP on the R shoulder at end of session x 5 mins UBE L 1,  2 mins fwd, 2 min reverse  Doorway stretches,  UT stretches 2x 30 secs, LS stretches 2x 30 secs, cervical retractions x 10 with 5 secs   Scapular retractions x 10   AROME Supine: flexion x 10, protraction, x 10  AROME Sidelying shoulder abduction x 10, ER x 10  Wall slides flexion x 20, H abd with towel x 20  Standing against wall: flexion, scaption, abduction 2x 5  each, using 1# DB increased pain   Reach up to wall with 1# DB 2x 5   Wall push x 10  Rows x 2x10  green tube   Shoulder ext x 2x10 green TB  Shoulder ER/IR with towel between arm and trunk x 20, green  tube  Thera -ex 40 mins  CP on the R shoulder at end of session x 5 mins UBE L 1,  2 mins fwd, 2 min reverse  Doorway stretches,  UT stretches 2x 30 secs, LS stretches 2x 30 secs, cervical retractions x 10 with 5 secs   Scapular retractions x 10   AROME Supine: flexion x 10, protraction, x 10  AROME Sidelying shoulder abduction x 10, ER x 10  Wall slides flexion x 20, H abd with towel x 20  Standing against wall: flexion, scaption, abduction 2x 5  each, using 1# DB increased pain   Reach up to wall with 1# DB 1x10  Biceps with eccentric contractions x 10 1# DB  Push occupied wheelchair  x 2 rounds inside clinic   Wall push x 10  Rows x 2x10  green tube   Shoulder ext x 2x10 green TB  Shoulder ER/IR with towel between arm and trunk x 20, green tube  Chest press green tube x 20  Horizontal pulls with YTB x 10  Thera -ex 40 mins  CP on the R shoulder at end of session x 5 mins     UBE L 1,  2 mins fwd, 2 min reverse  Doorway stretches,  UT stretches 2x 30 secs, LS stretches 2x 30 secs, cervical retractions x 10 with 5 secs   Scapular retractions x 10   AROME Supine: flexion x 10, protraction, x 10  AROME Sidelying shoulder abduction x 20, ER x 20  Wall slides flexion x 20, H abd with towel x 20  Standing against wall: flexion, scaption, abduction 1x 8, 1,2  each, using 1# DB increased pain   Reach up to wall with 1# DB 1x10  Biceps with eccentric contractions x 10 1# DB  Wall push 2x 10  Rows x 2x10  green tube   Shoulder ext x 2x10 green TB  Shoulder ER/IR with towel between arm and trunk x 20, green tube  Chest press green tube x 20  Horizontal pulls with YTB x 10  Thera -ex 40 mins  CP on the R shoulder at end of session x 5 mins UBE L 1,  2 mins fwd, 2 min reverse  Doorway stretches,  UT stretches 2x 30 secs, LS stretches 2x 30 secs, cervical retractions x 10 with 5 secs   AROME Supine: flexion x 10, alphabet A-P only (increased pain  STM for R anterior shoulder x 8 mins  AROME Sidelying shoulder abduction x 10, ER  x 10  Wall slides flexion x 10, H abd with towel x 10  Standing against wall: flexion, scaption, abduction 1x 2, using 1# DB increased pain , finished 1x 8 without DB  Reach up to wall with 1# DB 1x8  Wall push 2x 10  Rows x 2x10  green tube   Shoulder ext x 2x10 green TB  Shoulder ER/IR with towel between arm and trunk x 10, green tube  Thera -ex 30 mins  CP on the R shoulder at end of session x 5 mins                        HEP: Access Code: [email or web address]  URL: [email or web address]  Date: 03/26/2024  Prepared by: Azra Chen    Exercises  - Seated Scapular Retraction  - 2 x daily - 7 x weekly - 1-2 sets - 10 reps  - Circular Shoulder Pendulum with Table Support  - 2 x daily - 7 x weekly - 1-2 sets - 10 reps  - Flexion-Extension Shoulder Pendulum with Table Support  - 2 x daily - 7 x weekly - 1-2 sets - 10 reps  - Horizontal Shoulder Pendulum with Table Support  - 2 x daily - 7 x weekly - 1-2 sets - 10 reps  - Seated Gentle Upper Trapezius Stretch  - 2 x daily - 7 x weekly - 1-2 sets - 3 reps - 20 hold  - Gentle Levator Scapulae Stretch  - 2 x daily - 7 x weekly - 1-2 sets - 3 reps - 20 hold  - Seated Chin Tuck with Neck Elongation  - 2 x daily - 7 x weekly - 1-2 sets - 3 reps - 20 hold  - Supine Shoulder Flexion with Dowel  - 2 x daily - 7 x weekly - 1-2 sets - 10 reps  - Supine Shoulder Abduction AAROM with Dowel  - 2 x daily - 7 x weekly - 1-2 sets - 10 reps  - Supine Shoulder Press with Dowel  - 2 x daily - 7 x weekly - 1-2 sets - 10 reps  - Shoulder Flexion Wall Slide with Towel  - 2 x daily - 7 x weekly - 1-2 sets - 10 reps    Charges: 0   Total Timed Treatment:  0 min  Total Treatment Time: 15 min

## 2024-05-08 ENCOUNTER — OFFICE VISIT (OUTPATIENT)
Dept: PHYSICAL THERAPY | Age: 79
End: 2024-05-08
Attending: PHYSICIAN ASSISTANT
Payer: MEDICARE

## 2024-05-08 PROCEDURE — 97110 THERAPEUTIC EXERCISES: CPT | Performed by: PHYSICAL THERAPIST

## 2024-05-08 NOTE — PROGRESS NOTES
Diagnosis:   Right shoulder pain (M25.511)  Impingement of right shoulder (M25.811)  Adhesive capsulitis of right shoulder (M75.02)      Referring Provider: Alyssa De Leon  Date of Evaluation:    3/22/2024    Precautions:  None Next MD visit:   none scheduled  Date of Surgery: n/a   Insurance Primary/Secondary: [employer]ICARE / N/A     # Auth Visits: n/a            Subjective: Still sore on the R shoulder, but she was able to lift and pour her coffee, and she was also able to clean the floor with her  that she uses like a vacuum. She feels that the soreness will be there, since she has arthritis on her shoulder, but is not limiting her activities.     Pain:  5/10       Objective:   Palpation: Grade 1 tenderness on R anterior shoulder, over the greater tuberosity, R biceps. Tightness on R biceps, pectoralis minor.    Assessment: Patient responded well to PT with improvement with ROM and ability to perform activities with R UE. Improved awareness for posture.    Goals:   Goals: (to be met in 8 visits)   Pt will have decreased pain by 30%-50%in the R shoulder , arm and neck in 4 visits to be able to tolerate raising the R arm and use for ADLs.-MET 4/16; updated goal:Pt will have decreased pain by 90- 95%in the R shoulder , arm and neck to be able to tolerate raising the R arm and use for ADLs  Pt will improve shoulder flexion AROM to >120 degrees to be able to reach into overhead cabinets without pain or restriction - MET 4/16  Pt will improve shoulder abduction AROM to >120 degrees to improve ability to don deodorant, don/doff shirts, and wash hair - met 4/16  Pt will increase shoulder AROM ER to 90 to be able to reach and fasten seatbelt - MET 4/16  Pt will increase shoulder AROM IR to 70 to be able to reach in back pocket, tuck in shirt, and turn steering wheel without pain- MET 4/16  Pt will improve shoulder strength throughout to 4/5 to improve function with UE dressing, self care,  carrying, reaching, lifting, driving - PROGRESSING  Pt will demonstrate increased mid/low trap strength to 3/5 to promote improved shoulder mechanics and stabilization with lifting and reaching - MET 4/16  Pt will be independent and compliant with comprehensive HEP to maintain progress achieved in PT.- PROGRESSING    Plan: Continue PT as per updated POC. add resistive exercises to  shoulder and periscapular strengthening  Date: 5/8/2024   Tx #: 12/13       UBE L 1,  2 mins fwd, 2 min reverse  Doorway stretches,  UT stretches 2x 30 secs, LS stretches 2x 30 secs, cervical retractions x 10 with 5 secs   AROME Supine: flexion x 10, alphabet A-Z   AROME Sidelying shoulder abduction x 10, ER x 10  Wall slides flexion x 10, H abd with towel x 10  Standing against wall: flexion, scaption, abduction 1x5, using 1# DB   Reach up to wall with 1# DB 1x10  Wall push up 2x 10  Rows x 2x10  green tube   Shoulder ext x 2x10 green TB  Shoulder ER/IR with towel between arm and trunk x 20, green tube  Biceps curls with eccentric emphasis x 10  Thera -ex 42 mins                              HEP: Access Code: [email or web address]  URL: [email or web address]  Date: 03/26/2024  Prepared by: Azra Chen    Exercises  - Seated Scapular Retraction  - 2 x daily - 7 x weekly - 1-2 sets - 10 reps  - Circular Shoulder Pendulum with Table Support  - 2 x daily - 7 x weekly - 1-2 sets - 10 reps  - Flexion-Extension Shoulder Pendulum with Table Support  - 2 x daily - 7 x weekly - 1-2 sets - 10 reps  - Horizontal Shoulder Pendulum with Table Support  - 2 x daily - 7 x weekly - 1-2 sets - 10 reps  - Seated Gentle Upper Trapezius Stretch  - 2 x daily - 7 x weekly - 1-2 sets - 3 reps - 20 hold  - Gentle Levator Scapulae Stretch  - 2 x daily - 7 x weekly - 1-2 sets - 3 reps - 20 hold  - Seated Chin Tuck with Neck Elongation  - 2 x daily - 7 x weekly - 1-2 sets - 3 reps - 20 hold  - Supine Shoulder Flexion with Dowel  - 2 x daily - 7 x weekly - 1-2 sets - 10  reps  - Supine Shoulder Abduction AAROM with Dowel  - 2 x daily - 7 x weekly - 1-2 sets - 10 reps  - Supine Shoulder Press with Dowel  - 2 x daily - 7 x weekly - 1-2 sets - 10 reps  - Shoulder Flexion Wall Slide with Towel  - 2 x daily - 7 x weekly - 1-2 sets - 10 reps    Charges:  Thera -ex 3     Total Timed Treatment:  42 min  Total Treatment Time: 42 min

## 2024-05-10 ENCOUNTER — OFFICE VISIT (OUTPATIENT)
Dept: PHYSICAL THERAPY | Age: 79
End: 2024-05-10
Attending: PHYSICIAN ASSISTANT
Payer: MEDICARE

## 2024-05-10 PROCEDURE — 97110 THERAPEUTIC EXERCISES: CPT | Performed by: PHYSICAL THERAPIST

## 2024-05-10 NOTE — PROGRESS NOTES
Diagnosis:   Right shoulder pain (M25.511)  Impingement of right shoulder (M25.811)  Adhesive capsulitis of right shoulder (M75.02)      Referring Provider: Alyssa De Leon  Date of Evaluation:    3/22/2024    Precautions:  None Next MD visit:   none scheduled  Date of Surgery: n/a   Insurance Primary/Secondary: [employer] MEDICARE / N/A     # Auth Visits: n/a           Discharge Summary  Pt has attended 13 visits in Physical Therapy.   Subjective:  Certain movement of the R arm triggers pain, such as reaching back can be painful, but pain is short lived, and does not occur often,\" I will always have some type of pain, but I can do  everything I need to do, I wanted to be able to lift my cup and pour, and now I can do that. \" She is back to doing all her activities, including volunteer work.   Pain:  1-2/10 currently      Objective:     Observation/Posture: FHP, rounded shoulders, cristopher medial and inferior angle winging, symmetric shoulders  Palpation: Grade 1 tenderness on R anterior shoulder, over the greater tuberosity, R biceps, Tightness on the R UT  Sensation: Intact  Cervical Screen: UE DTRs - 2+  C AROM (deg) * pain- now WNL     AROM: (* denotes performed with pain)  Shoulder  Elbow   Flexion: supine- 160, R  sitting R - 160 ; L 160,   Abduction: R 150; L 150  ER: R 90; L 60  IR: R 60; L 50  Stabilized R arm at 90 deg Flexion: R WNL; L WNL  Extension: R WNL; L WNL  Supination: R WNL, L WNL  Pronation: R WNL, L WNL   PROM(deg) - R shoulder flexion- 165, abduction- 160, ER- 90, IR-70     Accessory motion: scapular elevation     Flexibility: Mild tightness on R rotator cuff muscles, biceps, deltoids, pectoralis major muscles     Strength/MMT: (* denotes performed with pain)  Shoulder Elbow Scapular   Flexion: R 4-/5; L 4/5  Abduction: R 4-/5; L 4/5  ER: R 4/5; L 4/5  IR: R 3+/5; L 3+/5 Flexion: R 4+/5; L 4+/5  Extension: R 4+/5; L 4+/5  Supination: R 4+/5; L 4+/5  Pronation: R 4+/5; L 4+/5  Rhomboids: R  3+/5, L 3+/5  Mid trap: R 3+/5; L 3+/5   Lats: R 4/5, L 4/5  Low trap: R 3/5, R 3/5    R - 43 psi, L - 34 psi  Special tests:   Empty can - positive for pain  Full can - negative for pain  Neer Impingement - positive for pain          Assessment: Patient progressed well with PT treatments. Noted improvement of ROM on the R shoulder and cervical spine. Also has improved strength on the R scapular and shoulder muscles leading to improvement with performance of daily activities that requires UE use. Noted improvement with function and decreased perceived disability based on QuickDASH score. Discussed with patient the importance of regular compliance with HEP, provided with an updated HEP. Patient understood. Patient is now discharged from PT.    Goals:   Goals: (to be met in 8 visits)   Pt will have decreased pain by 30%-50%in the R shoulder , arm and neck in 4 visits to be able to tolerate raising the R arm and use for ADLs.-MET 4/16; updated goal:Pt will have decreased pain by 90- 95%in the R shoulder , arm and neck to be able to tolerate raising the R arm and use for ADLs-MET 5/10/24  Pt will improve shoulder flexion AROM to >120 degrees to be able to reach into overhead cabinets without pain or restriction - MET 4/16  Pt will improve shoulder abduction AROM to >120 degrees to improve ability to don deodorant, don/doff shirts, and wash hair - met 4/16  Pt will increase shoulder AROM ER to 90 to be able to reach and fasten seatbelt - MET 4/16  Pt will increase shoulder AROM IR to 70 to be able to reach in back pocket, tuck in shirt, and turn steering wheel without pain- MET 4/16  Pt will improve shoulder strength throughout to 4/5 to improve function with UE dressing, self care, carrying, reaching, lifting, driving - MET 5/10/24  Pt will demonstrate increased mid/low trap strength to 3/5 to promote improved shoulder mechanics and stabilization with lifting and reaching - MET 4/16  Pt will be independent and  compliant with comprehensive HEP to maintain progress achieved in PT.- MET 5/10/24    Plan: Patient is now discharged from PT on HEP.  Date: 5/8/2024   Tx #: 12/13 Date: 5/10/2024  Tx: 13/13      UBE L 1,  2 mins fwd, 2 min reverse  Doorway stretches,  UT stretches 2x 30 secs, LS stretches 2x 30 secs, cervical retractions x 10 with 5 secs   AROME Supine: flexion x 10, alphabet A-Z   AROME Sidelying shoulder abduction x 10, ER x 10  Wall slides flexion x 10, H abd with towel x 10  Standing against wall: flexion, scaption, abduction 1x10, using 1# DB   Reach up to wall with 1# DB 1x10  Wall push up 2x 10  Rows x 2x10  green tube   Shoulder ext x 2x10 red TB  Shoulder ER/IR x20 with towel between arm and trunk   Biceps curls with eccentric emphasis x 10  Thera -ex 42 mins   UBE L 1,  2 mins fwd, 2 min reverse  Doorway stretches,  UT stretches 2x 30 secs, LS stretches 2x 30 secs, cervical retractions x 10 with 5 secs   AROME Supine: flexion x 10, alphabet A-Z   AROME Sidelying shoulder abduction x 10, ER x 10  Wall slides flexion x 10, H abd with towel x 10  Standing against wall: flexion, scaption, abduction 1x5, using 1# DB   Reach up to wall with 1# DB 1x10  Wall push up 1x 10  Rows x 1x10  green tube   Shoulder ext x 1x10 red TB  Shoulder ER x 5/IR x10 with towel between arm and trunk   Biceps curls with eccentric emphasis x 10  Thera -ex 45 mins                           HEP:   Access Code: [email or web address]  URL: [email or web address]  Date: 05/12/2024  Prepared by: Azra Chen    Exercises  - Seated Scapular Retraction  - 2 x daily - 7 x weekly - 1-2 sets - 10 reps  - Circular Shoulder Pendulum with Table Support  - 2 x daily - 7 x weekly - 1-2 sets - 10 reps  - Flexion-Extension Shoulder Pendulum with Table Support  - 2 x daily - 7 x weekly - 1-2 sets - 10 reps  - Horizontal Shoulder Pendulum with Table Support  - 2 x daily - 7 x weekly - 1-2 sets - 10 reps  - Seated Gentle Upper Trapezius Stretch  -  1 x daily - 7 x weekly - 1-2 sets - 3 reps - 20 hold  - Gentle Levator Scapulae Stretch  - 1 x daily - 7 x weekly - 1-2 sets - 3 reps - 20 hold  - Seated Cervical Rotation AROM  - 1 x daily - 7 x weekly - 1-2 sets - 10 reps - 5 hold  - Seated Chin Tuck with Neck Elongation  - 1 x daily - 7 x weekly - 1-2 sets - 3 reps - 20 hold  - Shoulder Flexion Wall Slide with Towel  - 2 x daily - 7 x weekly - 1-2 sets - 10 reps  - Supine Shoulder Flexion Extension Full Range AROM  - 1 x daily - 7 x weekly - 1-2 sets - 10 reps  - Supine Shoulder Alphabet  - 1 x daily - 7 x weekly - 1 sets - 1 reps  - Sidelying Shoulder Abduction Palm Forward  - 1 x daily - 7 x weekly - 1-2 sets - 10 reps  - Sidelying Shoulder External Rotation  - 1 x daily - 7 x weekly - 1-2 sets - 10 reps  - Standing Shoulder Flexion to 90 Degrees with Dumbbells  - 1 x daily - 7 x weekly - 1-2 sets - 5 reps  - Scaption with Dumbbells  - 1 x daily - 7 x weekly - 1-2 sets - 5 reps  - Shoulder Abduction with Dumbbells - Thumbs Up  - 1 x daily - 7 x weekly - 1-2 sets - 5 reps  - Wall Push Up  - 1 x daily - 7 x weekly - 1-2 sets - 10 reps  - Standing Bicep Curls Supinated with Dumbbells  - 1 x daily - 7 x weekly - 1-2 sets - 1 reps  - Standing Bilateral Low Shoulder Row with Anchored Resistance  - 1 x daily - 7 x weekly - 1-2 sets - 10 reps  - Shoulder extension with resistance - Neutral  - 1 x daily - 7 x weekly - 1-2 sets - 10 reps  - Shoulder Internal Rotation with Resistance  - 1 x daily - 7 x weekly - 1-2 sets - 10 reps  - Shoulder External Rotation with Anchored Resistance  - 2 x daily - 7 x weekly - 1-2 sets - 10 reps      Post QuickDASH Outcome Score  Post Score: 6.82 % (5/10/2024 10:29 AM)    25 % improvement    Plan: Patient is now discharged form PT.     Patient/Family/Caregiver was advised of these findings, precautions, and treatment options and has agreed to actively participate in planning and for this course of care.    Thank you for your referral. If  you have any questions, please contact me at Dept: [phone].    Sincerely,  Electronically signed by therapist: Azra Chen PT     Physician's certification required:  No  Please co-sign or sign and return this letter via fax as soon as possible to [phone].   I certify the need for these services furnished under this plan of treatment and while under my care.    X___________________________________________________ Date____________________    Certification From: 5/10/2024  To:8/8/2024    Charges:  Thera -ex 3     Total Timed Treatment:  45 min  Total Treatment Time: 45 min

## 2024-05-14 ENCOUNTER — APPOINTMENT (OUTPATIENT)
Dept: PHYSICAL THERAPY | Age: 79
End: 2024-05-14
Attending: PHYSICIAN ASSISTANT
Payer: MEDICARE

## 2024-11-06 ENCOUNTER — OFFICE VISIT (OUTPATIENT)
Dept: DERMATOLOGY | Age: 79
End: 2024-11-06

## 2024-11-06 DIAGNOSIS — L30.1 DYSHIDROSIS: Primary | ICD-10-CM

## 2024-11-06 DIAGNOSIS — L98.8 MACERATION OF SKIN: ICD-10-CM

## 2024-11-06 RX ORDER — BETAMETHASONE DIPROPIONATE 0.5 MG/G
OINTMENT, AUGMENTED TOPICAL 2 TIMES DAILY
Qty: 30 G | Refills: 3 | Status: SHIPPED | OUTPATIENT
Start: 2024-11-06

## 2024-11-06 RX ORDER — PREDNISONE 20 MG/1
TABLET ORAL
Qty: 14 TABLET | Refills: 0 | Status: SHIPPED | OUTPATIENT
Start: 2024-11-06

## (undated) DIAGNOSIS — E78.2 MIXED HYPERLIPIDEMIA: ICD-10-CM

## (undated) NOTE — LETTER
ASTHMA ACTION PLAN for Farhan Wall     : 1945     Date: 2021  Provider:  Javy Polanco DO  Phone for doctor or clinic: [address] [facility], [address] [place] [address] , [address] [facility]  [phone]

## (undated) NOTE — ED AVS SNAPSHOT
Jacob Worthy   MRN: [id number]    Department:  [facility] Emergency Department in [place]   Date of Visit:  2/3/2018           Disclosure     Insurance plans vary and the physician(s) referred by the ER may not be covered by your plan.  Please contact tell this physician (or your personal doctor if your instructions are to return to your personal doctor) about any new or lasting problems. The primary care or specialist physician will see patients referred from the [facility] Emergency Department.  Madeline Jaimes

## (undated) NOTE — MR AVS SNAPSHOT
[facility] [address]  [facility]  [phone]               Thank you for choosing us for your health care visit with Jhoana García MD.  We are glad to serve you and happy to provide you with [facility] Current Medications          This list is accurate as of: 4/26/17  4:35 PM.  Always use your most recent med list.                ASPIR-81 OR   Take 1 tablet by mouth daily.            atorvastatin 40 MG Tabs   Take 1 tablet by mouth  daily   Commonly known Dietary sodium reduction Reduce dietary sodium intake to <= 100 mmol per day (2.4 g sodium or 6 g sodium chloride)   Aerobic physical activity Regular aerobic physical activity (e.g., brisk walking, light jogging, cycling, swimming, etc.) for a goal of at

## (undated) NOTE — LETTER
[facility], [address] [facility] , [address]  [place] [address] [facility] [id number] [phone]            November 20, 2018      Mitch Esquivel  [address] [address]      Dear Ms. Mitch Esquivel,     It h

## (undated) NOTE — ED AVS SNAPSHOT
Ms. Gary Sutton   MRN: [id number]    Department:  [facility] Emergency Department in [place]   Date of Visit:  8/15/2019           Disclosure     Insurance plans vary and the physician(s) referred by the ER may not be covered by your plan.  Please con tell this physician (or your personal doctor if your instructions are to return to your personal doctor) about any new or lasting problems. The primary care or specialist physician will see patients referred from the [facility] Emergency Department.  Eagle Gaxiola

## (undated) NOTE — LETTER
Patient Name: Caroline Esquivel  YOB: 1945          MRN number:  [id number]  Date:  3/25/2024  Referring Physician:  Alyssa De Leon         SHOULDER EVALUATION:     Diagnosis:   Right shoulder pain (M25.511)  Impingement of right shoulder (M25.811)  Adhesive capsulitis of right shoulder (M75.02)      Referring Provider: Moises  Date of Evaluation:    3/22/2024    Precautions:  None Next MD visit:   none scheduled  Date of Surgery: n/a     PATIENT SUMMARY   Caroline Esquivel is a 78 year old, R handed, female who presents to therapy today with complaints of R shoulder and arm pain and has a hard time raising her R arm. She drove today for the 1st time in almost 3 weeks, she is getting depressed staying on the house. On March 5, 2024, she was walking her dog and tripped and she hit the sidewalk, she fell and landed on her R shoulder and head. She was brought to the ER by her son, Had x-ray on the R shoulder and CT scan. X-rays showed no acute fracture. Patient sought consult  with ortho and was  given steroid shot and recommended for PT.   Pt describes pain level current 5-6/10, at best 5-6/10, at worst 10/10. Pain is described as sharp, goes down down to the R arm and sometimes to the neck. Pain can occur even at rest. Takes Tylenol for pain, states that she has been taking pain meds more this month than in the past year because of the pain.  Current functional limitations include lifting, reaching, driving, self care and ADL, hard time with washing her back. She has been using the L arm, or bend her head down, and doing everything below the elbow level.    Caroline describes prior level of function ADLs independent with no pain, patient lives alone and volunteers. Pt goals include to be able to have less pain, to be able to drive with less pain, to be able to volunteer again.  Past medical history was reviewed with Caroline. Significant findings include  has a past medical history of Aortic valve sclerosis  (06/29/2012), Asthma (HCC), Essential hypertension (04/28/2021), GERD (gastroesophageal reflux disease) (03/12/2013), Hearing loss (03/12/2013), Herpes zoster without mention of complication (06/29/2012), Hyperlipidemia, Internal hemorrhoids without mention of complication (06/29/2012), Left bundle branch block (06/29/2012), Osteoporosis (06/29/2012), Other and unspecified ovarian cyst (06/29/2012), Other psoriasis, Pre-diabetes, and Wears glasses.     ASSESSMENT  Caroline presents to physical therapy evaluation with primary c/o R shoulder and arm pain and has a hard time raising her R arm.. The results of the objective tests and measures show LOM on the R shoulder, strength deficits, difficulty assuming supine position, decreased activity tolerance, postural deviation.  Functional deficits include but are not limited to UE dressing, self care activities, carrying, lifting, driving, and any activity that involves use of R UE.  Signs and symptoms are consistent with diagnosis of Right shoulder pain (M25.511)Impingement of right shoulder (M25.811) Adhesive capsulitis of right shoulder (M75.02). Pt and PT discussed evaluation findings, pathology, POC and HEP.  Pt voiced understanding and performs HEP correctly without reported pain. Skilled Physical Therapy is medically necessary to address the above impairments and reach functional goals.     OBJECTIVE:   Observation/Posture: FHP, rounded shoulders, cristopher medial and inferior angle winging, L shoulder higher than R  Palpation: Grade 2 tenderness on R anterior shoulder, over the greater tuberosity, R biceps  Sensation: Intact  Cervical Screen: UE DTRs - 2+  C AROM (deg) * pain  C flexion 40*, ext 10*, R lateral flexion- 30*, L lateral flex 40*, R rot - 50*, L rot 45*    AROM: (* denotes performed with pain)  Shoulder  Elbow   Flexion: supine, R 0; sitting R - 80* ; L 135,   Abduction: R 60; L 135  ER: R 70; L 60  IR: R 60; L 50  Stabilized R arm at 90 deg  All motons with  pain Flexion: R WNL; L WNL  Extension: R WNL; L WNL  Supination: R WNL, L WNL  Pronation: R WNL, L WNL   PROM(deg) - R shoulder flexion- 135, abduction- 135 , ER- 80, IR-70    Accessory motion: scapular elevation    Flexibility: Severe tightness on R rotator cuff muscles, biceps, deltoids, pectoralis major muscles    Strength/MMT: (* denotes performed with pain)  Shoulder Elbow Scapular   Flexion: R 1/5 (supine); L 3+/5  Sitting R - 3-/5  Abduction: R 3-/5; L 3+/5  ER: R 2/5; L 3+/5  IR: R 2/5; L 3+/5 Flexion: R 3+/5; L 4/5  Extension: R 3/5; L 4/5  Supination: R 3+/5; L 4/5  Pronation: R 3+/5; L 4/5  Rhomboids: R 2/5, L 2/5  Mid trap: R 2/5; L 2/5   Lats: R 2/5, L 3/5  Low trap: NT    R - 40 psi, L - 40 psi  Special tests:   Empty can - positive for pain  Full can - positive for pain  Neer Impingement - positive for pain    Today’s Treatment and Response:   Pt education was provided on exam findings, treatment diagnosis, treatment plan, expectations, and prognosis. Pt was also provided recommendations for possible soreness after evaluation, modalities as needed [ice/heat], postural corrections, and pain science education .  Patient was instructed in and issued a HEP for: Access Code: [email or web address]  URL: [email or web address]  Date: 03/22/2024  Prepared by: Azra Chen    Exercises  - Seated Scapular Retraction  - 2 x daily - 7 x weekly - 1-2 sets - 10 reps  - Circular Shoulder Pendulum with Table Support  - 2 x daily - 7 x weekly - 1-2 sets - 10 reps  - Flexion-Extension Shoulder Pendulum with Table Support  - 2 x daily - 7 x weekly - 1-2 sets - 10 reps  - Horizontal Shoulder Pendulum with Table Support  - 2 x daily - 7 x weekly - 1-2 sets - 10 reps    Charges: PT Eval Moderate Complexity, 1      Total Timed Treatment: 35 min     Total Treatment Time: 35 min     Based on clinical rationale and outcome measures, this evaluation involved Moderate Complexity decision making due to 1-2 personal  factors/comorbidities, 3 body structures involved/activity limitations, and evolving symptoms including changing pain levels.  PLAN OF CARE:    Goals: (to be met in 8 visits)   Pt will have decreased pain by 30%-50%in the R shoulder , arm and neck in 4 visits to be able to tolerate raising the R arm and use for ADLs.  Pt will improve shoulder flexion AROM to >120 degrees to be able to reach into overhead cabinets without pain or restriction   Pt will improve shoulder abduction AROM to >120 degrees to improve ability to don deodorant, don/doff shirts, and wash hair   Pt will increase shoulder AROM ER to 90 to be able to reach and fasten seatbelt   Pt will increase shoulder AROM IR to 70 to be able to reach in back pocket, tuck in shirt, and turn steering wheel without pain  Pt will improve shoulder strength throughout to 4/5 to improve function with UE dressing, self care, carrying, reaching, lifting, driving   Pt will demonstrate increased mid/low trap strength to 3/5 to promote improved shoulder mechanics and stabilization with lifting and reaching   Pt will be independent and compliant with comprehensive HEP to maintain progress achieved in PT.    Frequency / Duration: Patient will be seen for 2 x/week or a total of 8 visits over a 90 day period. Treatment will include: Manual Therapy, Neuromuscular Re-education, Therapeutic Exercise, Home Exercise Program instruction, and Modalities to include: Electrical stimulation (unattended) and Ultrasound    Education or treatment limitation: None  Rehab Potential:good    QuickDASH Outcome Score  Score: 31.82 % (3/21/2024  6:41 PM)      Patient/Family/Caregiver was advised of these findings, precautions, and treatment options and has agreed to actively participate in planning and for this course of care.    Thank you for your referral. Please co-sign or sign and return this letter via fax as soon as possible to [phone]. If you have any questions, please contact me at  Dept: [phone]    Sincerely,  Electronically signed by therapist: Azra Chen PT  Physician's certification required: Yes  I certify the need for these services furnished under this plan of treatment and while under my care.    X___________________________________________________ Date____________________    Certification From: 3/22/2024  To:6/20/2024 21st Century Cures Act Notice to Patient: Medical documents like this are made available to patients in the interest of transparency. However, be advised this is a medical document and it is intended as [id number] communication between your medical providers. This medical document may contain abbreviations, assessments, medical data, and results or other terms that are unfamiliar. Medical documents are intended to carry relevant information, facts as evident, and the clinical opinion of the practitioner. As such, this medical document may be written in language that appears blunt or direct. You are encouraged to contact your medical provider and/or [facility] Patient Experience if you have any questions about this medical document.

## (undated) NOTE — LETTER
Patient Name: Jacob Worthy  YOB: 1945          MRN :  [id number]  Date:  12/30/2021  Referring Physician:  Jean-Pierre Le    Progress Summary  Pt has attended 16 visits in Occupational Therapy. Subjective:  \"I was shoveling today.  It was a gela co-sign or sign and return this letter via fax as soon as possible to [phone]. I certify the need for these services furnished under this plan of treatment and while under my care.     X___________________________________________________ Date________

## (undated) NOTE — LETTER
Patient Name: Caroline Esquivel  YOB: 1945          MRN number:  [id number]  Date:  5/12/2024  Referring Physician:  Alyssa De Leon    Discharge Summary  Initial Functional Outcome Score 31.82/100  Final Functional Outcome Score 6.82/100  Number of Visits Attended 13 in Physical Therapy    Dear Dr. De Leon,    Discharge Summary  Pt has attended 13 visits in Physical Therapy.   Subjective:  Certain movement of the R arm triggers pain, such as reaching back can be painful, but pain is short lived, and does not occur often,\" I will always have some type of pain, but I can do  everything I need to do, I wanted to be able to lift my cup and pour, and now I can do that. \" She is back to doing all her activities, including volunteer work.   Pain:  1-2/10 currently      Objective:     Observation/Posture: FHP, rounded shoulders, cristopher medial and inferior angle winging, symmetric shoulders  Palpation: Grade 1 tenderness on R anterior shoulder, over the greater tuberosity, R biceps, Tightness on the R UT  Sensation: Intact  Cervical Screen: UE DTRs - 2+  C AROM (deg) * pain- now WNL     AROM: (* denotes performed with pain)  Shoulder  Elbow   Flexion: supine- 160, R  sitting R - 160 ; L 160,   Abduction: R 150; L 150  ER: R 90; L 60  IR: R 60; L 50  Stabilized R arm at 90 deg Flexion: R WNL; L WNL  Extension: R WNL; L WNL  Supination: R WNL, L WNL  Pronation: R WNL, L WNL   PROM(deg) - R shoulder flexion- 165, abduction- 160, ER- 90, IR-70     Accessory motion: scapular elevation     Flexibility: Mild tightness on R rotator cuff muscles, biceps, deltoids, pectoralis major muscles     Strength/MMT: (* denotes performed with pain)  Shoulder Elbow Scapular   Flexion: R 4-/5; L 4/5  Abduction: R 4-/5; L 4/5  ER: R 4/5; L 4/5  IR: R 3+/5; L 3+/5 Flexion: R 4+/5; L 4+/5  Extension: R 4+/5; L 4+/5  Supination: R 4+/5; L 4+/5  Pronation: R 4+/5; L 4+/5  Rhomboids: R 3+/5, L 3+/5  Mid trap: R 3+/5; L 3+/5   Lats: R 4/5, L  4/5  Low trap: R 3/5, R 3/5    R - 43 psi, L - 34 psi  Special tests:   Empty can - positive for pain  Full can - negative for pain  Neer Impingement - positive for pain          Assessment: Patient progressed well with PT treatments. Noted improvement of ROM on the R shoulder and cervical spine. Also has improved strength on the R scapular and shoulder muscles leading to improvement with performance of daily activities that requires UE use. Noted improvement with function and decreased perceived disability based on QuickDASH score. Discussed with patient the importance of regular compliance with HEP, provided with an updated HEP. Patient understood. Patient is now discharged from PT.    Goals:   Goals: (to be met in 8 visits)   Pt will have decreased pain by 30%-50%in the R shoulder , arm and neck in 4 visits to be able to tolerate raising the R arm and use for ADLs.-MET 4/16; updated goal:Pt will have decreased pain by 90- 95%in the R shoulder , arm and neck to be able to tolerate raising the R arm and use for ADLs-MET 5/10/24  Pt will improve shoulder flexion AROM to >120 degrees to be able to reach into overhead cabinets without pain or restriction - MET 4/16  Pt will improve shoulder abduction AROM to >120 degrees to improve ability to don deodorant, don/doff shirts, and wash hair - met 4/16  Pt will increase shoulder AROM ER to 90 to be able to reach and fasten seatbelt - MET 4/16  Pt will increase shoulder AROM IR to 70 to be able to reach in back pocket, tuck in shirt, and turn steering wheel without pain- MET 4/16  Pt will improve shoulder strength throughout to 4/5 to improve function with UE dressing, self care, carrying, reaching, lifting, driving - MET 5/10/24  Pt will demonstrate increased mid/low trap strength to 3/5 to promote improved shoulder mechanics and stabilization with lifting and reaching - MET 4/16  Pt will be independent and compliant with comprehensive HEP to maintain progress  achieved in PT.- MET 5/10/24    Plan: Patient is now discharged from PT on HEP.  Date: 5/8/2024   Tx #: 12/13 Date: 5/10/2024  Tx: 13/13      UBE L 1,  2 mins fwd, 2 min reverse  Doorway stretches,  UT stretches 2x 30 secs, LS stretches 2x 30 secs, cervical retractions x 10 with 5 secs   AROME Supine: flexion x 10, alphabet A-Z   AROME Sidelying shoulder abduction x 10, ER x 10  Wall slides flexion x 10, H abd with towel x 10  Standing against wall: flexion, scaption, abduction 1x10, using 1# DB   Reach up to wall with 1# DB 1x10  Wall push up 2x 10  Rows x 2x10  green tube   Shoulder ext x 2x10 red TB  Shoulder ER/IR x20 with towel between arm and trunk   Biceps curls with eccentric emphasis x 10  Thera -ex 42 mins   UBE L 1,  2 mins fwd, 2 min reverse  Doorway stretches,  UT stretches 2x 30 secs, LS stretches 2x 30 secs, cervical retractions x 10 with 5 secs   AROME Supine: flexion x 10, alphabet A-Z   AROME Sidelying shoulder abduction x 10, ER x 10  Wall slides flexion x 10, H abd with towel x 10  Standing against wall: flexion, scaption, abduction 1x5, using 1# DB   Reach up to wall with 1# DB 1x10  Wall push up 1x 10  Rows x 1x10  green tube   Shoulder ext x 1x10 red TB  Shoulder ER x 5/IR x10 with towel between arm and trunk   Biceps curls with eccentric emphasis x 10  Thera -ex 45 mins                           HEP:   Access Code: [email or web address]  URL: [email or web address]  Date: 05/12/2024  Prepared by: Azra Chen    Exercises  - Seated Scapular Retraction  - 2 x daily - 7 x weekly - 1-2 sets - 10 reps  - Circular Shoulder Pendulum with Table Support  - 2 x daily - 7 x weekly - 1-2 sets - 10 reps  - Flexion-Extension Shoulder Pendulum with Table Support  - 2 x daily - 7 x weekly - 1-2 sets - 10 reps  - Horizontal Shoulder Pendulum with Table Support  - 2 x daily - 7 x weekly - 1-2 sets - 10 reps  - Seated Gentle Upper Trapezius Stretch  - 1 x daily - 7 x weekly - 1-2 sets - 3 reps - 20  hold  - Gentle Levator Scapulae Stretch  - 1 x daily - 7 x weekly - 1-2 sets - 3 reps - 20 hold  - Seated Cervical Rotation AROM  - 1 x daily - 7 x weekly - 1-2 sets - 10 reps - 5 hold  - Seated Chin Tuck with Neck Elongation  - 1 x daily - 7 x weekly - 1-2 sets - 3 reps - 20 hold  - Shoulder Flexion Wall Slide with Towel  - 2 x daily - 7 x weekly - 1-2 sets - 10 reps  - Supine Shoulder Flexion Extension Full Range AROM  - 1 x daily - 7 x weekly - 1-2 sets - 10 reps  - Supine Shoulder Alphabet  - 1 x daily - 7 x weekly - 1 sets - 1 reps  - Sidelying Shoulder Abduction Palm Forward  - 1 x daily - 7 x weekly - 1-2 sets - 10 reps  - Sidelying Shoulder External Rotation  - 1 x daily - 7 x weekly - 1-2 sets - 10 reps  - Standing Shoulder Flexion to 90 Degrees with Dumbbells  - 1 x daily - 7 x weekly - 1-2 sets - 5 reps  - Scaption with Dumbbells  - 1 x daily - 7 x weekly - 1-2 sets - 5 reps  - Shoulder Abduction with Dumbbells - Thumbs Up  - 1 x daily - 7 x weekly - 1-2 sets - 5 reps  - Wall Push Up  - 1 x daily - 7 x weekly - 1-2 sets - 10 reps  - Standing Bicep Curls Supinated with Dumbbells  - 1 x daily - 7 x weekly - 1-2 sets - 1 reps  - Standing Bilateral Low Shoulder Row with Anchored Resistance  - 1 x daily - 7 x weekly - 1-2 sets - 10 reps  - Shoulder extension with resistance - Neutral  - 1 x daily - 7 x weekly - 1-2 sets - 10 reps  - Shoulder Internal Rotation with Resistance  - 1 x daily - 7 x weekly - 1-2 sets - 10 reps  - Shoulder External Rotation with Anchored Resistance  - 2 x daily - 7 x weekly - 1-2 sets - 10 reps      Post QuickDASH Outcome Score  Post Score: 6.82 % (5/10/2024 10:29 AM)    25 % improvement    Plan: Patient is now discharged form PT.     Patient/Family/Caregiver was advised of these findings, precautions, and treatment options and has agreed to actively participate in planning and for this course of care.    Thank you for your referral. If you have any questions, please contact me at  Dept: [phone].    Sincerely,  Electronically signed by therapist: Azra Chen PT     Physician's certification required:  No  Please co-sign or sign and return this letter via fax as soon as possible to [phone].   I certify the need for these services furnished under this plan of treatment and while under my care.    X___________________________________________________ Date____________________    Certification From: 5/10/2024  To:8/8/2024 21st Century Cures Act Notice to Patient: Medical documents like this are made available to patients in the interest of transparency. However, be advised this is a medical document and it is intended as [id number] communication between your medical providers. This medical document may contain abbreviations, assessments, medical data, and results or other terms that are unfamiliar. Medical documents are intended to carry relevant information, facts as evident, and the clinical opinion of the practitioner. As such, this medical document may be written in language that appears blunt or direct. You are encouraged to contact your medical provider and/or [facility] Patient Experience if you have any questions about this medical document.

## (undated) NOTE — Clinical Note
05/26/2017        Charles Membreno  [facility] [id number]      Dear Miguel Range,    [facility] records indicate that you have outstanding lab work and or testing that was ordered for you and has not yet been completed:      CBC [5005]  COMP METABO

## (undated) NOTE — LETTER
Patient Name: Caroline Esquivel  YOB: 1945          MRN :  [id number]  Date:  4/17/2024  Referring Physician:  Alyssa De Leon    Progress Summary  Pt has attended 8 visits in Physical Therapy.    Subjective: Patient reports that she has been able to fill a cup and pour and take it from the microwave, but she also noticed that she had to limit the amount of activities that she does, when she does more activities, then she is not able to perform those tasks because of the pain. It is tender to touch in front of the R shoulder, and it clicks, the pain also occurs with certain activities.    Pain: 3/10 now, but can still increase to 8/10 with certain activities      Objective:   Observation/Posture: FHP, rounded shoulders, cristopher medial and inferior angle winging, symmetric shoulders  Palpation: Grade 1 tenderness on R anterior shoulder, over the greater tuberosity, R biceps  Sensation: Intact  Cervical Screen: UE DTRs - 2+  C AROM (deg) * pain  C flexion WNL, ext WNL, R lateral flexion- 45, L lateral flex 45, R rot - 60, L rot 60     AROM: (* denotes performed with pain)  Shoulder  Elbow   Flexion: supine- 150, R  sitting R - 145 ; L 135,   Abduction: R 145; L 135  ER: R 90; L 60  IR: R 60; L 50  Stabilized R arm at 90 deg  All motons with pain Flexion: R WNL; L WNL  Extension: R WNL; L WNL  Supination: R WNL, L WNL  Pronation: R WNL, L WNL   PROM(deg) - R shoulder flexion- 160, abduction- 135 , ER- 90, IR-70     Accessory motion: scapular elevation     Flexibility: Moderate tightness on R rotator cuff muscles, biceps, deltoids, pectoralis major muscles     Strength/MMT: (* denotes performed with pain)  Shoulder Elbow Scapular   Flexion: R 3/5 (supine); L 3+/5  Sitting R - 3+/5  Abduction: R 3+/5; L 4/5  ER: R 3/5; L 3+/5  IR: R 3/5; L 3+/5 Flexion: R 4/5; L 4/5  Extension: R 4/5; L 4/5  Supination: R 4/5; L 4/5  Pronation: R 4/5; L 4/5  Rhomboids: R 3/5, L 3/5  Mid trap: R 3/5; L 3/5   Lats: R 3/5, L 3/5  Low  trap: R 3/5, R 3/5    R - 37 psi, L - 30 psi  Special tests:   Empty can - positive for pain  Full can - positive for pain  Neer Impingement - positive for pain         Assessment: Patient responded well to PT treatment, noted improvement of ROM on the R shoulder with improved ability to lift and raise arm. Patient also has improving shoulder and scapular strength. Patient has been able to perform exercises with minimal onset of pain, but still requires rest periods in between. Muscles weakness still limit patient's function. Patient is below their previous level of function and will benefit from skills and knowledge of PT to manage/ decrease symptoms and achieve established goals and return to PLOF.    Goals:   Goals: (to be met in 8 visits)   Pt will have decreased pain by 30%-50%in the R shoulder , arm and neck in 4 visits to be able to tolerate raising the R arm and use for ADLs.-MET 4/16; updated goal:Pt will have decreased pain by 90- 95%in the R shoulder , arm and neck to be able to tolerate raising the R arm and use for ADLs  Pt will improve shoulder flexion AROM to >120 degrees to be able to reach into overhead cabinets without pain or restriction - MET 4/16  Pt will improve shoulder abduction AROM to >120 degrees to improve ability to don deodorant, don/doff shirts, and wash hair - met 4/16  Pt will increase shoulder AROM ER to 90 to be able to reach and fasten seatbelt - MET 4/16  Pt will increase shoulder AROM IR to 70 to be able to reach in back pocket, tuck in shirt, and turn steering wheel without pain- MET 4/16  Pt will improve shoulder strength throughout to 4/5 to improve function with UE dressing, self care, carrying, reaching, lifting, driving - PROGRESSING  Pt will demonstrate increased mid/low trap strength to 3/5 to promote improved shoulder mechanics and stabilization with lifting and reaching - MET 4/16  Pt will be independent and compliant with comprehensive HEP to maintain progress  achieved in PT.- PROGRESSING    Plan: Continue PT as per updated POC. add resistive exercises to  shoulder and periscapular strengthening  Date: 4/12/2024  Tx #: 7/8 Date: 4/16/2024  Tx #: 8/8      UBE L 1,  2 mins fwd, 2 min reverse  HP on R shoulder/ arm while doing UT stretches 2x 30 secs, LS stretches 2x 30 secs, cervical retractions x 10 with 5 secs   Manual therapy: x 5 mins, STM biceps,  anterior deltoids, Grade 1 oscillations, posterior and inferior directions, followed by PROME on R shoulder all planes x 10 reps  AROME Supine: flexion x 15, protraction, alphabet x 15x 1 set  AROME Sidelying shoulder abduction x 10, ER x 15  Wall slides flexion x 20, H abd with towel x 20  Standing against wall: flexion, scaption, abduction 1x 5  each, using 1# DB increased pain   Reach up to wall with 1# DB 2x 5   Wall push x 10  Rows x 20  green tube   Shoulder ext x 20 green TB  Shoulder ER/IR with towel between arm and trunk x 20, green tube  Thera -ex 40 mins  CP on the R shoulder at end of session x 5 mins UBE L 1,  2 mins fwd, 2 min reverse  Doorway stretches,  UT stretches 2x 30 secs, LS stretches 2x 30 secs, cervical retractions x 10 with 5 secs   Scapular retractions x 10   AROME Supine: flexion x 10, protraction, x 10  AROME Sidelying shoulder abduction x 10, ER x 10  Wall slides flexion x 20, H abd with towel x 20  Standing against wall: flexion, scaption, abduction 2x 5  each, using 1# DB increased pain   Reach up to wall with 1# DB 2x 5   Wall push x 10  Rows x 2x10  green tube   Shoulder ext x 2x10 green TB  Shoulder ER/IR with towel between arm and trunk x 20, green tube  Thera -ex 40 mins  CP on the R shoulder at end of session x 5 mins                           HEP: Access Code: [email or web address]  URL: [email or web address]  Date: 03/26/2024  Prepared by: Azra Chen    Exercises  - Seated Scapular Retraction  - 2 x daily - 7 x weekly - 1-2 sets - 10 reps  - Circular Shoulder Pendulum with Table Support  -  2 x daily - 7 x weekly - 1-2 sets - 10 reps  - Flexion-Extension Shoulder Pendulum with Table Support  - 2 x daily - 7 x weekly - 1-2 sets - 10 reps  - Horizontal Shoulder Pendulum with Table Support  - 2 x daily - 7 x weekly - 1-2 sets - 10 reps  - Seated Gentle Upper Trapezius Stretch  - 2 x daily - 7 x weekly - 1-2 sets - 3 reps - 20 hold  - Gentle Levator Scapulae Stretch  - 2 x daily - 7 x weekly - 1-2 sets - 3 reps - 20 hold  - Seated Chin Tuck with Neck Elongation  - 2 x daily - 7 x weekly - 1-2 sets - 3 reps - 20 hold  - Supine Shoulder Flexion with Dowel  - 2 x daily - 7 x weekly - 1-2 sets - 10 reps  - Supine Shoulder Abduction AAROM with Dowel  - 2 x daily - 7 x weekly - 1-2 sets - 10 reps  - Supine Shoulder Press with Dowel  - 2 x daily - 7 x weekly - 1-2 sets - 10 reps  - Shoulder Flexion Wall Slide with Towel  - 2 x daily - 7 x weekly - 1-2 sets - 10 reps  Post QuickDASH Outcome Score  Post Score: 6.82 % (4/16/2024  1:14 PM)    25 % improvement    Plan: Continue skilled Physical Therapy 1 x/week or a total of 4 visits over a 90 day period. Treatment will include: Therapeutic exercises, Therapeutic activities, NMR, Manual therapy, HEP education.    Patient/Family/Caregiver was advised of these findings, precautions, and treatment options and has agreed to actively participate in planning and for this course of care.    Thank you for your referral. If you have any questions, please contact me at Dept: [phone].    Sincerely,  Electronically signed by therapist: Azra Chen PT     Physician's certification required:  No  Please co-sign or sign and return this letter via fax as soon as possible to [phone].   I certify the need for these services furnished under this plan of treatment and while under my care.    X___________________________________________________ Date____________________    Certification From: 4/16/2024  To:7/15/2024            21st Century Cures Act Notice to Patient: Medical  documents like this are made available to patients in the interest of transparency. However, be advised this is a medical document and it is intended as [id number] communication between your medical providers. This medical document may contain abbreviations, assessments, medical data, and results or other terms that are unfamiliar. Medical documents are intended to carry relevant information, facts as evident, and the clinical opinion of the practitioner. As such, this medical document may be written in language that appears blunt or direct. You are encouraged to contact your medical provider and/or [facility] Patient Experience if you have any questions about this medical document.

## (undated) NOTE — Clinical Note
ASTHMA ACTION PLAN for Eugenie Guadarrama     : 1945     Date: 2017  Provider:  Kelsey Fuller MD  Phone for doctor or clinic: [facility], [address] [place] [address] [facility] [id number] [phone]    ACT

## (undated) NOTE — LETTER
Patient Name: Carlos Barr  YOB: 1945          MRN :  [id number]  Date:  11/22/2021  Referring Physician:  Kymberly Blair    Progress Summary  Pt has attended 8 visits in Occupational Therapy.    Subjective: \"I've resigned myself to the fact return this letter via fax as soon as possible to [phone]. I certify the need for these services furnished under this plan of treatment and while under my care.     X___________________________________________________ Date____________________    Erin Albarran

## (undated) NOTE — LETTER
Patient Name: Caroline Esquivel  YOB: 1945          MRN number:  [id number]  Date:  3/25/2024  Referring Physician:  Alyssa De Leon         SHOULDER EVALUATION:     Diagnosis:   Right shoulder pain (M25.511)  Impingement of right shoulder (M25.811)  Adhesive capsulitis of right shoulder (M75.02)      Referring Provider: Moises  Date of Evaluation:    3/22/2024    Precautions:  None Next MD visit:   none scheduled  Date of Surgery: n/a     PATIENT SUMMARY   Caroline Esquivel is a 78 year old, R handed, female who presents to therapy today with complaints of R shoulder and arm pain and has a hard time raising her R arm. She drove today for the 1st time in almost 3 weeks, she is getting depressed staying on the house. On March 5, 2024, she was walking her dog and tripped and she hit the sidewalk, she fell and landed on her R shoulder and head. She was brought to the ER by her son, Had x-ray on the R shoulder and CT scan. X-rays showed no acute fracture. Patient sought consult  with ortho and was  given steroid shot and recommended for PT.   Pt describes pain level current 5-6/10, at best 5-6/10, at worst 10/10. Pain is described as sharp, goes down down to the R arm and sometimes to the neck. Pain can occur even at rest. Takes Tylenol for pain, states that she has been taking pain meds more this month than in the past year because of the pain.  Current functional limitations include lifting, reaching, driving, self care and ADL, hard time with washing her back. She has been using the L arm, or bend her head down, and doing everything below the elbow level.    Caroline describes prior level of function ADLs independent with no pain, patient lives alone and volunteers. Pt goals include to be able to have less pain, to be able to drive with less pain, to be able to volunteer again.  Past medical history was reviewed with Caroline. Significant findings include  has a past medical history of Aortic valve sclerosis  (06/29/2012), Asthma (HCC), Essential hypertension (04/28/2021), GERD (gastroesophageal reflux disease) (03/12/2013), Hearing loss (03/12/2013), Herpes zoster without mention of complication (06/29/2012), Hyperlipidemia, Internal hemorrhoids without mention of complication (06/29/2012), Left bundle branch block (06/29/2012), Osteoporosis (06/29/2012), Other and unspecified ovarian cyst (06/29/2012), Other psoriasis, Pre-diabetes, and Wears glasses.     ASSESSMENT  Caroline presents to physical therapy evaluation with primary c/o R shoulder and arm pain and has a hard time raising her R arm.. The results of the objective tests and measures show LOM on the R shoulder, strength deficits, difficulty assuming supine position, decreased activity tolerance, postural deviation.  Functional deficits include but are not limited to UE dressing, self care activities, carrying, lifting, driving, and any activity that involves use of R UE.  Signs and symptoms are consistent with diagnosis of Right shoulder pain (M25.511)Impingement of right shoulder (M25.811) Adhesive capsulitis of right shoulder (M75.02). Pt and PT discussed evaluation findings, pathology, POC and HEP.  Pt voiced understanding and performs HEP correctly without reported pain. Skilled Physical Therapy is medically necessary to address the above impairments and reach functional goals.     OBJECTIVE:   Observation/Posture: FHP, rounded shoulders, cristopher medial and inferior angle winging, L shoulder higher than R  Palpation: Grade 2 tenderness on R anterior shoulder, over the greater tuberosity, R biceps  Sensation: Intact  Cervical Screen: UE DTRs - 2+  C AROM (deg) * pain  C flexion 40*, ext 10*, R lateral flexion- 30*, L lateral flex 40*, R rot - 50*, L rot 45*    AROM: (* denotes performed with pain)  Shoulder  Elbow   Flexion: supine, R 0; sitting R - 80* ; L 135,   Abduction: R 60; L 135  ER: R 70; L 60  IR: R 60; L 50  Stabilized R arm at 90 deg  All motons with  pain Flexion: R WNL; L WNL  Extension: R WNL; L WNL  Supination: R WNL, L WNL  Pronation: R WNL, L WNL   PROM(deg) - R shoulder flexion- 135, abduction- 135 , ER- 80, IR-70    Accessory motion: scapular elevation    Flexibility: Severe tightness on R rotator cuff muscles, biceps, deltoids, pectoralis major muscles    Strength/MMT: (* denotes performed with pain)  Shoulder Elbow Scapular   Flexion: R 1/5 (supine); L 3+/5  Sitting R - 3-/5  Abduction: R 3-/5; L 3+/5  ER: R 2/5; L 3+/5  IR: R 2/5; L 3+/5 Flexion: R 3+/5; L 4/5  Extension: R 3/5; L 4/5  Supination: R 3+/5; L 4/5  Pronation: R 3+/5; L 4/5  Rhomboids: R 2/5, L 2/5  Mid trap: R 2/5; L 2/5   Lats: R 2/5, L 3/5  Low trap: NT    R - 40 psi, L - 40 psi  Special tests:   Empty can - positive for pain  Full can - positive for pain  Neer Impingement - positive for pain    Today’s Treatment and Response:   Pt education was provided on exam findings, treatment diagnosis, treatment plan, expectations, and prognosis. Pt was also provided recommendations for possible soreness after evaluation, modalities as needed [ice/heat], postural corrections, and pain science education .  Patient was instructed in and issued a HEP for: Access Code: [email or web address]  URL: [email or web address]  Date: 03/22/2024  Prepared by: Azra Chen    Exercises  - Seated Scapular Retraction  - 2 x daily - 7 x weekly - 1-2 sets - 10 reps  - Circular Shoulder Pendulum with Table Support  - 2 x daily - 7 x weekly - 1-2 sets - 10 reps  - Flexion-Extension Shoulder Pendulum with Table Support  - 2 x daily - 7 x weekly - 1-2 sets - 10 reps  - Horizontal Shoulder Pendulum with Table Support  - 2 x daily - 7 x weekly - 1-2 sets - 10 reps    Charges: PT Eval Moderate Complexity, 1      Total Timed Treatment: 35 min     Total Treatment Time: 35 min     Based on clinical rationale and outcome measures, this evaluation involved Moderate Complexity decision making due to 1-2 personal  factors/comorbidities, 3 body structures involved/activity limitations, and evolving symptoms including changing pain levels.  PLAN OF CARE:    Goals: (to be met in 8 visits)   Pt will have decreased pain by 30%-50%in the R shoulder , arm and neck in 4 visits to be able to tolerate raising the R arm and use for ADLs.  Pt will improve shoulder flexion AROM to >120 degrees to be able to reach into overhead cabinets without pain or restriction   Pt will improve shoulder abduction AROM to >120 degrees to improve ability to don deodorant, don/doff shirts, and wash hair   Pt will increase shoulder AROM ER to 90 to be able to reach and fasten seatbelt   Pt will increase shoulder AROM IR to 70 to be able to reach in back pocket, tuck in shirt, and turn steering wheel without pain  Pt will improve shoulder strength throughout to 4/5 to improve function with UE dressing, self care, carrying, reaching, lifting, driving   Pt will demonstrate increased mid/low trap strength to 3/5 to promote improved shoulder mechanics and stabilization with lifting and reaching   Pt will be independent and compliant with comprehensive HEP to maintain progress achieved in PT.    Frequency / Duration: Patient will be seen for 2 x/week or a total of 8 visits over a 90 day period. Treatment will include: Manual Therapy, Neuromuscular Re-education, Therapeutic Exercise, Home Exercise Program instruction, and Modalities to include: Electrical stimulation (unattended) and Ultrasound    Education or treatment limitation: None  Rehab Potential:good    QuickDASH Outcome Score  Score: 31.82 % (3/21/2024  6:41 PM)      Patient/Family/Caregiver was advised of these findings, precautions, and treatment options and has agreed to actively participate in planning and for this course of care.    Thank you for your referral. Please co-sign or sign and return this letter via fax as soon as possible to [phone]. If you have any questions, please contact me at  Dept: [phone]    Sincerely,  Electronically signed by therapist: Azra Chen PT  Physician's certification required: Yes  I certify the need for these services furnished under this plan of treatment and while under my care.    X___________________________________________________ Date____________________    Certification From: 3/22/2024  To:6/20/2024 21st Century Cures Act Notice to Patient: Medical documents like this are made available to patients in the interest of transparency. However, be advised this is a medical document and it is intended as [id number]-qndc communication between your medical providers. This medical document may contain abbreviations, assessments, medical data, and results or other terms that are unfamiliar. Medical documents are intended to carry relevant information, facts as evident, and the clinical opinion of the practitioner. As such, this medical document may be written in language that appears blunt or direct. You are encouraged to contact your medical provider and/or [facility] Patient Experience if you have any questions about this medical document.